# Patient Record
Sex: FEMALE | Race: WHITE | NOT HISPANIC OR LATINO | Employment: UNEMPLOYED | RURAL
[De-identification: names, ages, dates, MRNs, and addresses within clinical notes are randomized per-mention and may not be internally consistent; named-entity substitution may affect disease eponyms.]

---
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
## 2023-01-04 ENCOUNTER — CLINICAL SUPPORT (OUTPATIENT)
Dept: REHABILITATION | Facility: HOSPITAL | Age: 18
End: 2023-01-04
Payer: COMMERCIAL

## 2023-01-04 DIAGNOSIS — M48.061 SPINAL STENOSIS, LUMBAR REGION, WITHOUT NEUROGENIC CLAUDICATION: ICD-10-CM

## 2023-01-04 DIAGNOSIS — M51.26 OTHER INTERVERTEBRAL DISC DISPLACEMENT, LUMBAR REGION: ICD-10-CM

## 2023-01-04 DIAGNOSIS — M54.40 ACUTE BILATERAL LOW BACK PAIN WITH SCIATICA, SCIATICA LATERALITY UNSPECIFIED: Primary | ICD-10-CM

## 2023-01-04 DIAGNOSIS — M54.50 LOW BACK PAIN: ICD-10-CM

## 2023-01-04 PROCEDURE — 97161 PT EVAL LOW COMPLEX 20 MIN: CPT | Mod: PN

## 2023-01-04 PROCEDURE — 97110 THERAPEUTIC EXERCISES: CPT | Mod: PN

## 2023-01-04 NOTE — PLAN OF CARE
[facility]   Physical Therapy Initial Evaluation    Name: Mallory Rojo  Clinic Number: [id number]    Therapy Diagnosis:   Encounter Diagnoses   Name Primary?    Low back pain     Other intervertebral disc displacement, lumbar region     Spinal stenosis, lumbar region, without neurogenic claudication      Physician: Hamilton Holman MD    Physician Orders: PT Eval and Treat    Medical Diagnosis from Referral: spinal stenosis , with radiculopathy, lumbar and thoracic   Evaluation Date: 1/4/2023  Authorization Period Expiration: 2/3/2023  Plan of Care Expiration: 2/23/2023 blue cross unlimited , medicaid   Visit # / Visits authorized: 1/ unlimited     Time In: 1315  Time Out: 1400  Total Appointment Time (timed & untimed codes): 50 minutes    Precautions: Standard    Subjective   Date of onset: pt states she has had bad back pain greater than 5 years . Pt states she had low back sx 2020 feb. Pt voices her back has worsened and she has worse spinal stenosis and multiple ruptured disc and going to require sx to correct. Pt voices sitting still for a long time or being hunched over a long time hurts her the most. Pt voices all activities hurt her back , standing walking etc.    History of current condition - Mallory reports: hx below      Medical History:   Past Medical History:   Diagnosis Date    Asthma     Obesity        Surgical History:   Mallory Rojo  has a past surgical history that includes Back surgery and Tonsillectomy.    Medications:   Mallory has a current medication list which includes the following prescription(s): albuterol, albuterol-ipratropium, allergy relief (cetirizine), citalopram, diclofenac, epinephrine, ferrous sulfate, ibuprofen, montelukast, norelgestromin-ethinyl estradiol, tri-lo-sprintec, omeprazole, ondansetron, pregabalin, promethazine, symbicort, and tizanidine.    Allergies:   Review of patient's allergies indicates:   Allergen Reactions    Aller xt-tree pollen-white oak Other (See  Comments)    Cat hair standardized allergenic extract Other (See Comments)    Cobalt Dermatitis    Grass pollen-red top, standard Other (See Comments)    Imidazolidinyl urea Dermatitis    Neomycin Dermatitis    Urea Dermatitis    Weed pollen-dog fennel Other (See Comments)    Cefdinir Rash        Imaging, MRI studies:      Prior Therapy: 2 years ago   Social History:   lives with their family  Occupation: student   Prior Level of Function: pain with all activities  Current Level of Function: pain and radicular symptoms     Pain:  Current 6/10, worst 10/10, best 6/10   Location: bilateral back   Description: Aching, Dull, Burning, Throbbing, Grabbing, Tight, Deep, and Shooting  Aggravating Factors: Sitting, Standing, Laying, Bending, and Eating  Easing Factors: heating pad    Pts goals: to quit hurting .     Objective     Posture:  Standing lordosis: Increased  Sitting lordosis: Increased  Iliac crest height: right increased  PSIS height: right increased  Pelvic rotation/torsion: no  Scoliosis: no      MANUAL MUSCLE TEST  Right left   Hip flexion       L1-2 MMT number: 3+/5 MMT strength: 4/5   Hip abduction  L4,5,  S1 MMT strength: 3+/5 MMT strength: 4/5   Knee extension  L3 MMT strength: 3+/5 MMT strength: 4/5   Knee flexion       L3,4 MMT strength: 3+/5 MMT strength: 4-/5   Ankle dorsiflexion   L4 MMT strength: 3+/5 MMT strength: 4/5   Ext hallucis longus L5 MMT strength: 3+/5 MMT strength: 3+/5   Ankle plantar flexion S1 MMT strength: 3+/5 MMT strength: 3+/5      ROM/flexibility right left   Hip flexion (120) 95 95   Internal rotation (45) 35 25   External rotation (45) 35 35   Hamstring 90/90 (-10) -15 -15                    Special test Right  Left    SLR test < 60 degrees Negative Negative   SLR test > 60 degrees Negative Negative   Sitting slump test Negative Negative   Piriformis test Negative Negative   DELMAR test Positive Positive   SI forward bend Positive Positive   SI distraction Negative Positive   SI  compression Negative Positive     Gait assessment:   Antalgic gait: yes  Assistive device: none    Spinal mobility:  Segment:     Other test/information:    Palpation:     Dermatome:      Limitation/Restriction for FOTO lumbar  Survey    Therapist reviewed FOTO scores for Mallory Rojo on 1/4/2023.   FOTO documents entered into [employer] - see Media section.    Limitation Score: 34%         TREATMENT       Mallory received the treatments listed below:  THERAPEUTIC EXERCISES to develop strength, endurance, ROM, flexibility, posture, and core stabilization for 20 minutes including home ex program     Home Exercises and Patient Education Provided    Education provided:   - home ex program     Written Home Exercises Provided: yes.  Exercises were reviewed and Mallory was able to demonstrate them prior to the end of the session.  Mallory demonstrated good  understanding of the education provided.     See EMR under Patient Instructions for exercises provided 1/4/2023.    Assessment   Mallory is a 17 y.o. female referred to outpatient Physical Therapy with a medical diagnosis of low back pain with multiple disc rupture, thoracic and lumbar radiculopathy . Pt presents with decreased lumbar flexibility, weak core strength back pain with radiculopathy.     Pt prognosis is Excellent.   Pt will benefit from skilled outpatient Physical Therapy to address the deficits stated above and in the chart below, provide pt/family education, and to maximize pt's level of independence.     Plan of care discussed with patient: Yes  Pt's spiritual, cultural and educational needs considered and patient is agreeable to the plan of care and goals as stated below:     Anticipated Barriers for therapy: decreased range of motion and strength       Goals:  Short Term Goals: 4 weeks   Pt will be independent with home ex program.   Pt will increase bilateral hip flexion to 110 degrees   Pt will decrease back pain from 6/10  to 3/10 to improve quality of life    Increase lower extremity from 3+/5 to 5/5 to increase functional mobility and endurance.   Be able to return to work without radicular symptoms     Long Term Goals: 6 weeks   Pt will be able to work 8 hours  Be able to sleep all night without being awoken with pain.     Plan   Plan of care Certification: 1/4/2023 to 2/3/2023.    Outpatient Physical Therapy 2 times weekly for 4 weeks to include the following interventions: Patient Education, Therapeutic Exercise, Ultrasound, and modalities  .     Plan of care has been reestablished with Sherri CARR and Cheli CARR.       Khoa Kimble, PT           I CERTIFY THE NEED FOR THESE SERVICES FURNISHED UNDER THIS PLAN OF TREATMENT AND WHILE UNDER MY CARE.    Physician's comments:      Physician's Signature: ___________________________________________________

## 2023-01-18 ENCOUNTER — CLINICAL SUPPORT (OUTPATIENT)
Dept: REHABILITATION | Facility: HOSPITAL | Age: 18
End: 2023-01-18
Payer: COMMERCIAL

## 2023-01-18 DIAGNOSIS — M54.40 ACUTE BILATERAL LOW BACK PAIN WITH SCIATICA, SCIATICA LATERALITY UNSPECIFIED: Primary | ICD-10-CM

## 2023-01-18 DIAGNOSIS — M25.69 DECREASED RANGE OF MOTION OF TRUNK AND BACK: ICD-10-CM

## 2023-01-18 PROCEDURE — 97110 THERAPEUTIC EXERCISES: CPT | Mod: PN,CQ

## 2023-01-18 PROCEDURE — 97014 ELECTRIC STIMULATION THERAPY: CPT | Mod: PN,CQ

## 2023-01-18 NOTE — PROGRESS NOTES
Physical Therapy Treatment Note     Name: Mallory Rojo  Clinic Number: [id number]    Therapy Diagnosis: No diagnosis found.  Physician: Hamilton Holman MD    Visit Date: 1/18/2023    Physician Orders: PT Eval and Treat    Medical Diagnosis from Referral: spinal stenosis , with radiculopathy, lumbar and thoracic   Evaluation Date: 1/4/2023  Authorization Period Expiration: 2/3/2023  Plan of Care Expiration: 2/23/2023 blue cross unlimited , medicaid   Visit # / Visits authorized: 2/ unlimited   PTA Visit #: 1    Time In: 140  Time Out: 225  Total Billable Time: 45 minutes    Precautions: Standard  Plan of care reviewed with Khoa Kimble PT.      Subjective     Pt reports: I had an incident this am of loss of bladder control.  She was compliant with home exercise program.  Response to previous treatment: sore  Functional change: none noted    Pain: 6/10  Location: bilateral back      Objective     Mallory received therapeutic exercises to develop strength, endurance, ROM, flexibility, posture, and core stabilization for 25 minutes including:  Swissball knee flexion x 10  Swissball trunk rotation x 10  Bilateral single knee to chest x 5  Hip adduction with bolster x 15  Bilateral hamstrings stretch on step x 5  Bilateral side bend stretch x 5  Double support squats total gym x 20    Range of motion   Hip flexion   right  96        left  96   Hamstrings   right  -14       left  -14      Mallory received the following direct contact modalities after being cleared for contraindications:     Mallory received the following supervised modalities after being cleared for contradictions: IFC Electrical Stimulation:  Mallory received IFC Electrical Stimulation for pain control applied to the low back. Pt received stimulation at 100 % scan at a frequency of 14 for 20 minutes. Mallory tolderated treatment well without any adverse effects.      Mallory received hot pack for 20 minutes to low back.      Home Exercises Provided and Patient Education  Provided     Education provided: home exercise program     Written Home Exercises Provided: Patient instructed to cont prior HEP.  Exercises were reviewed and Mallory was able to demonstrate them prior to the end of the session.  Mallory demonstrated good understanding of the education provided.     See EMR under Patient Instructions for exercises provided prior visit.    Assessment     Patient instructed to contact Dr with incontinence issue. Patient voicing decreased pain 3/10 after treatment   Mallory Is progressing well towards her goals.   Pt prognosis is Fair.     Pt will continue to benefit from skilled outpatient physical therapy to address the deficits listed in the problem list box on initial evaluation, provide pt/family education and to maximize pt's level of independence in the home and community environment.     Pt's spiritual, cultural and educational needs considered and pt agreeable to plan of care and goals.     Anticipated barriers to physical therapy: compliance with home exercise program     Goals:  Short Term Goals: 4 weeks   Pt will be independent with home ex program.   Pt will increase bilateral hip flexion to 110 degrees   Pt will decrease back pain from 6/10  to 3/10 to improve quality of life   Increase lower extremity from 3+/5 to 5/5 to increase functional mobility and endurance.   Be able to return to work without radicular symptoms      Long Term Goals: 6 weeks   Pt will be able to work 8 hours  Be able to sleep all night without being awoken with pain.    Plan     Plan of care Certification: 1/4/2023 to 2/3/2023.     Outpatient Physical Therapy 2 times weekly for 4 weeks to include the following interventions: Patient Education, Therapeutic Exercise, Ultrasound, and modalities  .   Razia Phillips, PTA  1/18/2023

## 2023-02-16 NOTE — PLAN OF CARE
Physical Therapy Treatment Note      Name: Mallory Rojo  Clinic Number: [id number]     Therapy Diagnosis: No diagnosis found.  Physician: Hamilton Holman MD     Visit Date: 1/18/2023     Physician Orders: PT Eval and Treat    Medical Diagnosis from Referral: spinal stenosis , with radiculopathy, lumbar and thoracic   Evaluation Date: 1/4/2023  Authorization Period Expiration: 2/3/2023  Plan of Care Expiration: 2/23/2023 blue cross unlimited , medicaid   Visit # / Visits authorized: 2/ unlimited   PTA Visit #: 1     Time In: 140  Time Out: 225  Total Billable Time: 45 minutes     Precautions: Standard  Plan of care reviewed with Khoa Kimble PT.       Subjective      Pt reports: I had an incident this am of loss of bladder control.  She was compliant with home exercise program.  Response to previous treatment: sore  Functional change: none noted     Pain: 6/10  Location: bilateral back       Objective      Mallory received therapeutic exercises to develop strength, endurance, ROM, flexibility, posture, and core stabilization for 25 minutes including:  Swissball knee flexion x 10  Swissball trunk rotation x 10  Bilateral single knee to chest x 5  Hip adduction with bolster x 15  Bilateral hamstrings stretch on step x 5  Bilateral side bend stretch x 5  Double support squats total gym x 20     Range of motion   Hip flexion   right  96        left  96   Hamstrings   right  -14       left  -14        Mallory received the following direct contact modalities after being cleared for contraindications:      Mallory received the following supervised modalities after being cleared for contradictions: IFC Electrical Stimulation:  Mallory received IFC Electrical Stimulation for pain control applied to the low back. Pt received stimulation at 100 % scan at a frequency of 14 for 20 minutes. Mallory tolderated treatment well without any adverse effects.       Mallory received hot pack for 20 minutes to low back.        Home Exercises Provided and  Patient Education Provided      Education provided: home exercise program      Written Home Exercises Provided: Patient instructed to cont prior HEP.  Exercises were reviewed and Mallory was able to demonstrate them prior to the end of the session.  Mallory demonstrated good understanding of the education provided.      See EMR under Patient Instructions for exercises provided prior visit.     Assessment      Patient instructed to contact Dr with incontinence issue. Patient voicing decreased pain 3/10 after treatment   Mallory Is progressing well towards her goals.   Pt prognosis is Fair.      Pt will continue to benefit from skilled outpatient physical therapy to address the deficits listed in the problem list box on initial evaluation, provide pt/family education and to maximize pt's level of independence in the home and community environment.      Pt's spiritual, cultural and educational needs considered and pt agreeable to plan of care and goals.     Anticipated barriers to physical therapy: compliance with home exercise program      Goals:  Short Term Goals: 4 weeks   Pt will be independent with home ex program.   Pt will increase bilateral hip flexion to 110 degrees   Pt will decrease back pain from 6/10  to 3/10 to improve quality of life   Increase lower extremity from 3+/5 to 5/5 to increase functional mobility and endurance.   Be able to return to work without radicular symptoms      Long Term Goals: 6 weeks   Pt will be able to work 8 hours  Be able to sleep all night without being awoken with pain.     Plan     Outpatient Therapy Discharge Summary     Name: Mallory Rojo  Clinic Number: [id number]    Therapy Diagnosis:   Encounter Diagnoses   Name Primary?    Acute bilateral low back pain with sciatica, sciatica laterality unspecified Yes    Decreased range of motion of trunk and back      Physician: Hamilton Holman MD         Date of Last visit: 1/18/2023   pt attended 2 visits        Assessment    Goals:  Unable  to assess     Discharge reason: Patient has not attended therapy since 1/18/2023    Plan   This patient is discharged from Physical Therapy.    Khoa Kimble, PT

## 2023-04-05 DIAGNOSIS — J45.909 ASTHMA, UNSPECIFIED ASTHMA SEVERITY, UNSPECIFIED WHETHER COMPLICATED, UNSPECIFIED WHETHER PERSISTENT: ICD-10-CM

## 2023-04-06 RX ORDER — ALBUTEROL SULFATE 90 UG/1
AEROSOL, METERED RESPIRATORY (INHALATION)
Qty: 36 G | Refills: 5 | OUTPATIENT
Start: 2023-04-06

## 2023-04-06 RX ORDER — ALBUTEROL SULFATE 90 UG/1
2 AEROSOL, METERED RESPIRATORY (INHALATION) EVERY 4 HOURS PRN
Qty: 36 G | Refills: 5 | Status: SHIPPED | OUTPATIENT
Start: 2023-04-06 | End: 2023-10-06 | Stop reason: SDUPTHER

## 2023-06-01 DIAGNOSIS — J45.909 ASTHMA, UNSPECIFIED ASTHMA SEVERITY, UNSPECIFIED WHETHER COMPLICATED, UNSPECIFIED WHETHER PERSISTENT: ICD-10-CM

## 2023-06-01 DIAGNOSIS — J30.89 NON-SEASONAL ALLERGIC RHINITIS, UNSPECIFIED TRIGGER: ICD-10-CM

## 2023-06-02 RX ORDER — CETIRIZINE HYDROCHLORIDE 10 MG/1
TABLET ORAL
Qty: 30 TABLET | Refills: 2 | Status: SHIPPED | OUTPATIENT
Start: 2023-06-02 | End: 2023-10-06 | Stop reason: SDUPTHER

## 2023-06-02 RX ORDER — MONTELUKAST SODIUM 10 MG/1
10 TABLET ORAL NIGHTLY
Qty: 30 TABLET | Refills: 2 | Status: SHIPPED | OUTPATIENT
Start: 2023-06-02 | End: 2023-10-06 | Stop reason: SDUPTHER

## 2023-06-02 RX ORDER — BUDESONIDE AND FORMOTEROL FUMARATE DIHYDRATE 80; 4.5 UG/1; UG/1
AEROSOL RESPIRATORY (INHALATION)
Qty: 10.2 G | Refills: 11 | Status: SHIPPED | OUTPATIENT
Start: 2023-06-02 | End: 2023-11-14 | Stop reason: SDUPTHER

## 2023-06-02 NOTE — TELEPHONE ENCOUNTER
Please see the attached refill request. Patient notified that she will need office visit for additional refills.

## 2023-08-14 DIAGNOSIS — M54.50 LOWER BACK PAIN: Primary | ICD-10-CM

## 2023-08-15 DIAGNOSIS — M54.16 LUMBAR RADICULOPATHY: Primary | ICD-10-CM

## 2023-08-28 ENCOUNTER — HOSPITAL ENCOUNTER (OUTPATIENT)
Dept: RADIOLOGY | Facility: HOSPITAL | Age: 18
Discharge: HOME | End: 2023-08-28
Attending: ORTHOPAEDIC SURGERY
Payer: COMMERCIAL

## 2023-08-28 DIAGNOSIS — M54.16 LUMBAR RADICULOPATHY: ICD-10-CM

## 2023-08-31 ENCOUNTER — HOSPITAL ENCOUNTER (OUTPATIENT)
Dept: RADIOLOGY | Facility: HOSPITAL | Age: 18
Discharge: HOME | End: 2023-08-31
Attending: ORTHOPAEDIC SURGERY
Payer: COMMERCIAL

## 2023-08-31 PROCEDURE — 72110 X-RAY EXAM L-2 SPINE 4/>VWS: CPT | Mod: TC

## 2023-09-01 ENCOUNTER — OFFICE VISIT (OUTPATIENT)
Dept: SPINE | Facility: CLINIC | Age: 18
End: 2023-09-01
Payer: COMMERCIAL

## 2023-09-01 VITALS
HEIGHT: 69 IN | TEMPERATURE: 98 F | HEART RATE: 73 BPM | WEIGHT: 293 LBS | RESPIRATION RATE: 18 BRPM | DIASTOLIC BLOOD PRESSURE: 78 MMHG | OXYGEN SATURATION: 98 % | SYSTOLIC BLOOD PRESSURE: 120 MMHG | BODY MASS INDEX: 43.4 KG/M2

## 2023-09-01 DIAGNOSIS — Z01.818 PRE-OPERATIVE EXAMINATION: Primary | ICD-10-CM

## 2023-09-01 DIAGNOSIS — M54.50 LOWER BACK PAIN: ICD-10-CM

## 2023-09-01 DIAGNOSIS — M54.16 LUMBAR RADICULOPATHY: ICD-10-CM

## 2023-09-01 PROCEDURE — 3078F DIAST BP <80 MM HG: CPT | Mod: CPTII,,, | Performed by: NURSE PRACTITIONER

## 2023-09-01 PROCEDURE — 99215 OFFICE O/P EST HI 40 MIN: CPT | Mod: PBBFAC | Performed by: ORTHOPAEDIC SURGERY

## 2023-09-01 PROCEDURE — 3008F BODY MASS INDEX DOCD: CPT | Mod: CPTII,,, | Performed by: NURSE PRACTITIONER

## 2023-09-01 PROCEDURE — 1159F PR MEDICATION LIST DOCUMENTED IN MEDICAL RECORD: ICD-10-PCS | Mod: CPTII,,, | Performed by: NURSE PRACTITIONER

## 2023-09-01 PROCEDURE — 1159F MED LIST DOCD IN RCRD: CPT | Mod: CPTII,,, | Performed by: NURSE PRACTITIONER

## 2023-09-01 PROCEDURE — 3074F SYST BP LT 130 MM HG: CPT | Mod: CPTII,,, | Performed by: NURSE PRACTITIONER

## 2023-09-01 PROCEDURE — 3078F PR MOST RECENT DIASTOLIC BLOOD PRESSURE < 80 MM HG: ICD-10-PCS | Mod: CPTII,,, | Performed by: NURSE PRACTITIONER

## 2023-09-01 PROCEDURE — 99204 PR OFFICE/OUTPT VISIT, NEW, LEVL IV, 45-59 MIN: ICD-10-PCS | Mod: S$PBB,,, | Performed by: NURSE PRACTITIONER

## 2023-09-01 PROCEDURE — 99204 OFFICE O/P NEW MOD 45 MIN: CPT | Mod: S$PBB,,, | Performed by: NURSE PRACTITIONER

## 2023-09-01 PROCEDURE — 3008F PR BODY MASS INDEX (BMI) DOCUMENTED: ICD-10-PCS | Mod: CPTII,,, | Performed by: NURSE PRACTITIONER

## 2023-09-01 PROCEDURE — 3074F PR MOST RECENT SYSTOLIC BLOOD PRESSURE < 130 MM HG: ICD-10-PCS | Mod: CPTII,,, | Performed by: NURSE PRACTITIONER

## 2023-09-01 NOTE — PROGRESS NOTES
MDM/time:  Greater than 45 minutes spent on this encounter including 15 minutes reviewing imaging and notes, 20 minutes with the patient, 10 minutes documentation    ASSESSMENT:  17 y.o. female with lumbar spondylosis with radiculopathy    PLAN:  Discussed surgical options - L2-L5 laminectomy     HPI:  17 y.o. female here for evaluation of lower back pain that radiates into bilateral hips left groin area and down left leg.  She also complains of pain between the shoulder blades that will radiate around her ribcage area and into her neck.  She reports a 5+ year history of back pain.  She reports initially when pain started she was seen in the emergency room and by multiple doctors and was told she was having groin pain.  She reports pain with sharp stabbing with burning sensation.  She reports pain progressed to the point she was unable to stand and walk for any length of time and was in a wheelchair.  In 2019 she was seen in [place] by Dr. Holman MRIs were taken and surgery was scheduled.  She reports surgery went well but still had pain after surgery but was not in a wheelchair any longer.  She is had multiple sessions of physical therapy after this surgery that did not seem to improve her pain.  She is been seeing [facility] Dr. Davis and has had a total of 4 epidural injections which has not helped her pain discomfort.  She reports issues with balance with multiple falls.  Bladder urgency but no incontinence or bowel issues.  Can not stand or walk for more than 5 minutes at a time.  Currently taking ibuprofen and Tylenol as needed for pain.  She is also on Lyrica 75 twice a day for pain.  She is had multiple sessions of physical therapy with little to no pain relief.  Pain management as discussed above.  Recent MRI lumbar spine.  Prior spine surgery with Dr. Holman this was and L3-4 left hemilaminectomy and L4-5 left jacky laminectomy.  Patient currently vapes nicotine    IMAGING:  AP, lateral,  flexion/extension views of the lumbar spine reviewed     On the AP there is normal coronal alignment.  There are 5 non-rib-bearing lumbar vertebrae.  On the lateral there is maintained lumbar lordosis.  No fractures or listhesis noted.  No instability on flexion-extension views.     Impression:  Normal lumbar spine      07/17/2023 MRI lumbar spine [facility] reviewed showed:   L2-3 central disc herniation with severe central/bilateral lateral recess stenosis   L3-4 prior left hemilaminotomy moderate bilateral recess stenosis  L4-5 left paracentral disc herniation with prior left hemilaminotomy moderate bilateral lateral recess stenosis    T10-11 central disc protrusion no significant stenosis    Past Medical History:   Diagnosis Date    Anxiety     Asthma     Obesity      Past Surgical History:   Procedure Laterality Date    BACK SURGERY      TONSILLECTOMY       Social History     Tobacco Use    Smoking status: Former     Types: Vaping with nicotine    Smokeless tobacco: Never   Substance Use Topics    Alcohol use: Yes     Comment: occ    Drug use: Never      Current Outpatient Medications   Medication Instructions    albuterol (VENTOLIN HFA) 90 mcg/actuation inhaler 2 puffs, Inhalation, Every 4 hours PRN, Rescue    albuterol-ipratropium (DUO-NEB) 2.5 mg-0.5 mg/3 mL nebulizer solution USE 1 AMPULE IN NEBULIZER EVERY 6 HOURS AS NEEDED FOR WHEEZING    cetirizine (ZYRTEC) 10 MG tablet Take 1 tablet (10 mg total) by mouth once daily.    citalopram (CELEXA) 20 mg, Oral, Daily    diclofenac (VOLTAREN) 75 mg, Oral, Daily    EPINEPHrine (EPIPEN) 0.3 mg, Intramuscular, As needed (PRN)    ferrous sulfate 325 mg, Oral, Daily    ibuprofen (ADVIL,MOTRIN) 800 mg, Oral, Every 6 hours PRN    montelukast (SINGULAIR) 10 mg, Oral, Nightly    norelgestromin-ethinyl estradiol (ORTHO EVRA) 150-35 mcg/24 hr 1 patch, Transdermal, Weekly    norgestimate-ethinyl estradioL (TRI-LO-SPRINTEC) 0.18/0.215/0.25 mg-25 mcg tablet 1 tablet,  Oral, Daily    omeprazole (PRILOSEC) 40 mg, Oral, Every morning    ondansetron (ZOFRAN-ODT) 4 mg, Oral, 2 times daily    pregabalin (LYRICA) 75 mg, Oral, 2 times daily    promethazine (PHENERGAN) 25 mg, Rectal, Every 6 hours PRN    SYMBICORT 80-4.5 mcg/actuation HFAA inhale TWO puffs BY MOUTH TWICE DAILY    tiZANidine (ZANAFLEX) 4 MG tablet TAKE 1 TABLET BY MOUTH EVERY DAY AT BEDTIME AS NEEDED        EXAM:  Constitutional  General Appearance:  Body mass index is 44.01 kg/m²., NAD  Psychiatric   Orientation: Oriented to time, oriented to place, oriented to person  Mood and Affect: Active and alert, normal mood, normal affect  Gait and Station   Appearance:  Antalgic gait to the left, unable to tandem gait, able to walk on toes, able to walk on heels    LUMBAR  Musculoskeletal System   Hips: Normal appearance, no leg length discrepancy, normal motion; left, normal motion; right    Lumbar Spine                   Inspection:  Normal alignment, normal sagittal balance                  Range of motion: Decreased flexion, extension, lateral bending, rotation. Pain with range of motion                  Bony Palpation of the Lumbar Spine:  No tenderness of the spinous process, no tenderness of the sacrum, no tenderness of the coccyx                  Bony Palpation of the Right Hip:  No tenderness of the iliac crest, no tenderness of the sciatic notch, no tenderness of the SI joint                  Bony Palpation of the Left Hip:  No tenderness of the iliac crest, no tenderness of the sciatic notch, no tenderness of the SI joint                  Soft Tissue Palpation on the Right:  No tenderness of the paraspinal region, no tenderness of the iliolumbar region                  Soft Tissue Palpation on the Left:  No tenderness of the paraspinal region, no tenderness of the iliolumbar region    Motor Strength   L1 Right:  Hip flexion iliopsoas 4/5    L1 Left:  Hip flexion iliopsoas 4/5              L2-L4 Right:  Knee extension  quadriceps 4/5, tibialis anterior 5/5              L2-L4 Left:  Knee extension quadriceps 5/5, tibialis anterior 5/5   L5 Right:  Extensor hallucis llongus 4/5,    L5 Left:  Extensor hallucis longus 5/5,    S1 Right:  Plantar flexion gastrocnemius 4/5   S1 Left:  Plantar flexion gastrocnemius 5/5    Neurological System   Ankle Reflex Right:  normal   Ankle Reflex Left: normal   Knee Reflex Right:  normal   Knee Reflex Left:  normal   Sensation on the Right:  L2 normal, L3 normal, L4 normal, L5 normal, S1 normal   Sensation on the Left:  L2 normal, L3 normal, L4 normal, L5 normal, S1 normal              Special Test on the Right:  Seated straight leg raising test negative, no clonus of the ankle              Special Test on the Left:  Seated straight leg raising test negative, no clonus of the ankle    Skin   Lumbosacral Spine:  Normal skin    Cardiovascular System   Arterial Pulses Right:  Posterior tibialis normal, dorsalis pedis normal   Arterial Pulses Left:  Posterior tibialis normal, dorsalis pedis normal   Edema Right: None   Edema Left:  None

## 2023-09-01 NOTE — PROGRESS NOTES
AP, lateral, flexion/extension views of the lumbar spine reviewed    On the AP there is normal coronal alignment.  There are 5 non-rib-bearing lumbar vertebrae.  On the lateral there is maintained lumbar lordosis.  No fractures or listhesis noted.  No instability on flexion-extension views.    Impression:  Normal lumbar spine

## 2023-09-01 NOTE — PATIENT INSTRUCTIONS
Surgery is scheduled for 09/27/2023.    You will need to do the following Pre-op workup:  1.Labs  2. Chest xray- at the [facility]  3. EKG- 2nd floor

## 2023-09-05 RX ORDER — MUPIROCIN 20 MG/G
OINTMENT TOPICAL
Status: CANCELLED | OUTPATIENT
Start: 2023-09-05

## 2023-09-05 RX ORDER — SODIUM CHLORIDE 9 MG/ML
INJECTION, SOLUTION INTRAVENOUS CONTINUOUS
Status: CANCELLED | OUTPATIENT
Start: 2023-09-05

## 2023-09-21 ENCOUNTER — CLINICAL SUPPORT (OUTPATIENT)
Dept: RESPIRATORY THERAPY | Facility: HOSPITAL | Age: 18
End: 2023-09-21
Attending: ORTHOPAEDIC SURGERY
Payer: COMMERCIAL

## 2023-09-21 ENCOUNTER — HOSPITAL ENCOUNTER (OUTPATIENT)
Dept: RADIOLOGY | Facility: HOSPITAL | Age: 18
Discharge: HOME | End: 2023-09-21
Attending: ORTHOPAEDIC SURGERY
Payer: COMMERCIAL

## 2023-09-21 DIAGNOSIS — Z01.818 PRE-OPERATIVE EXAMINATION: ICD-10-CM

## 2023-09-21 PROCEDURE — 71046 X-RAY EXAM CHEST 2 VIEWS: CPT | Mod: TC

## 2023-09-21 PROCEDURE — 93010 ELECTROCARDIOGRAM REPORT: CPT | Mod: ,,, | Performed by: INTERNAL MEDICINE

## 2023-09-21 PROCEDURE — 93005 ELECTROCARDIOGRAM TRACING: CPT

## 2023-09-21 PROCEDURE — 93010 EKG 12-LEAD: ICD-10-PCS | Mod: ,,, | Performed by: INTERNAL MEDICINE

## 2023-09-25 ENCOUNTER — OFFICE VISIT (OUTPATIENT)
Dept: SPINE | Facility: CLINIC | Age: 18
End: 2023-09-25
Payer: COMMERCIAL

## 2023-09-25 DIAGNOSIS — Z01.818 PRE-OPERATIVE EXAMINATION: ICD-10-CM

## 2023-09-25 DIAGNOSIS — M48.062 LUMBAR STENOSIS WITH NEUROGENIC CLAUDICATION: Primary | ICD-10-CM

## 2023-09-25 DIAGNOSIS — M54.16 LUMBAR RADICULOPATHY: ICD-10-CM

## 2023-09-25 PROCEDURE — 3044F HG A1C LEVEL LT 7.0%: CPT | Mod: CPTII,,, | Performed by: ORTHOPAEDIC SURGERY

## 2023-09-25 PROCEDURE — 99215 OFFICE O/P EST HI 40 MIN: CPT | Mod: S$PBB,,, | Performed by: ORTHOPAEDIC SURGERY

## 2023-09-25 PROCEDURE — 99215 PR OFFICE/OUTPT VISIT, EST, LEVL V, 40-54 MIN: ICD-10-PCS | Mod: S$PBB,,, | Performed by: ORTHOPAEDIC SURGERY

## 2023-09-25 PROCEDURE — 3044F PR MOST RECENT HEMOGLOBIN A1C LEVEL <7.0%: ICD-10-PCS | Mod: CPTII,,, | Performed by: ORTHOPAEDIC SURGERY

## 2023-09-25 PROCEDURE — 99212 OFFICE O/P EST SF 10 MIN: CPT | Mod: PBBFAC | Performed by: ORTHOPAEDIC SURGERY

## 2023-09-25 RX ORDER — PROMETHAZINE HYDROCHLORIDE 25 MG/1
25 TABLET ORAL EVERY 4 HOURS
Qty: 45 TABLET | Refills: 0 | Status: SHIPPED | OUTPATIENT
Start: 2023-09-25

## 2023-09-25 RX ORDER — OXYCODONE AND ACETAMINOPHEN 5; 325 MG/1; MG/1
1 TABLET ORAL EVERY 4 HOURS PRN
Qty: 45 TABLET | Refills: 0 | Status: SHIPPED | OUTPATIENT
Start: 2023-09-25 | End: 2023-10-10 | Stop reason: SDUPTHER

## 2023-09-25 RX ORDER — CYCLOBENZAPRINE HCL 5 MG
5 TABLET ORAL 3 TIMES DAILY PRN
Qty: 45 TABLET | Refills: 0 | Status: SHIPPED | OUTPATIENT
Start: 2023-09-25 | End: 2023-10-10

## 2023-09-25 NOTE — PATIENT INSTRUCTIONS
Arrive to the ground floor of the AMBULATORY surgery building at 6:30AM  Do not eat or drink after midnight (this includes, gum, candy, chewing tobacco etc.)  Bring all medication in the original bottles.  Bring anything you may need for an overnight stay.  Bathe with Mayra-Hex the night or morning before your surgery.  The morning of you surgery ONLY take blood pressure medications, heart medications, medications for acid reflux and thyroid medications (if you are on any, that you take in the AM).  Take these medications with a sip of water.   Do NOT take any diabetic medications the AM of your surgery.  Be sure that you have stopped blood thinners at appropriate time, as instructed.  Bring your C-Pap machine if you have one.  All jewelry and piercings MUST be removed prior to surgery.   False eye lashes must be removed prior to surgery.    Prescriptions that we usually send in for after surgery are:    PERCOCET(OXYCODONE)- This is to be taken for pain.  FLEXERIL(CYCLOBENZAPRINE)- This is to be taken for muscle spasms/tightness  PHENERGAN- This medication is to be taken for nausea.      If you take any of the following medications STOP them ONE WEEK before surgery:  Ozempic,weqovy,rybelsus, Dulaglutide(Trulicity), Tiazepatide(Mounjaro), Bydurcon    If you take any of the following medications STOP them 24 hours PRIOR to your surgery:  Libraglutide (saxenda), Adlyxen (Lixixerutose), Louis Cazares Victoza.  [email or web address]

## 2023-09-25 NOTE — PROGRESS NOTES
MDM/time:  Greater than 40 minutes spent on this encounter including 10 minutes reviewing imaging and notes, 25 minutes with the patient, 5 minutes documentation    ASSESSMENT:  17 y.o. female with lumbar spondylosis with radiculopathy    PLAN:  She has exhausted nonoperative measures and elected proceed with surgery.  This will be a L2-L5 laminectomy     The final decision for surgery was made today during the office visit. The rationale, technique, recovery process, non-operative alternatives and potential complications associated with posterior lumbar decompression (e.g. laminectomy or discectomy) were discussed with the patient in detail. The specific risks discussed included: medical/anesthetic complications, positioning palsy, infection, dural tear, epidural hematoma, wound hematoma, major vascular injury with life threatening hemorrhage, thromboembolism, paraplegia due to spinal cord or cauda equina injury, bowel/bladder/sexual dysfunction, lower extremity weakness/sensory loss due to nerve root injury, recurrent stenosis or disc herniation, progressive spondylosis/disc degeneration, chronic back pain/stiffness, chronic lower extremity pain, incomplete relief of preoperative symptoms and possible need for further surgery. All questions posed by the patient were answered. The surgical consent form was signed.     HPI:  17 y.o. female here for evaluation of lower back pain that radiates into bilateral hips left groin area and down left leg.  She also complains of pain between the shoulder blades that will radiate around her ribcage area and into her neck.  She reports a 5+ year history of back pain.  She reports initially when pain started she was seen in the emergency room and by multiple doctors and was told she was having groin pain.  She reports pain with sharp stabbing with burning sensation.  She reports pain progressed to the point she was unable to stand and walk for any length of time and was in a  wheelchair.  In 2019 she was seen in [place] by Dr. Holman MRIs were taken and surgery was scheduled.  She reports surgery went well but still had pain after surgery but was not in a wheelchair any longer.  She is had multiple sessions of physical therapy after this surgery that did not seem to improve her pain.  She is been seeing [facility] Dr. Davis and has had a total of 4 epidural injections which has not helped her pain discomfort.  She reports issues with balance with multiple falls.  Bladder urgency but no incontinence or bowel issues.  Can not stand or walk for more than 5 minutes at a time.  Currently taking ibuprofen and Tylenol as needed for pain.  She is also on Lyrica 75 twice a day for pain.  She is had multiple sessions of physical therapy with little to no pain relief.  Pain management as discussed above.  Recent MRI lumbar spine.  Prior spine surgery with Dr. Holman this was and L3-4 left hemilaminectomy and L4-5 left jacky laminectomy.  Patient currently vapes nicotine    IMAGING:  AP, lateral, flexion/extension views of the lumbar spine reviewed     On the AP there is normal coronal alignment.  There are 5 non-rib-bearing lumbar vertebrae.  On the lateral there is maintained lumbar lordosis.  No fractures or listhesis noted.  No instability on flexion-extension views.     Impression:  Normal lumbar spine      07/17/2023 MRI lumbar spine [facility] reviewed showed:   L2-3 central disc herniation with severe central/bilateral lateral recess stenosis   L3-4 prior left hemilaminotomy moderate bilateral recess stenosis  L4-5 left paracentral disc herniation with prior left hemilaminotomy moderate bilateral lateral recess stenosis    T10-11 central disc protrusion no significant stenosis    Past Medical History:   Diagnosis Date    Anxiety     Asthma     Obesity      Past Surgical History:   Procedure Laterality Date    BACK SURGERY      TONSILLECTOMY       Social History     Tobacco Use     Smoking status: Former     Types: Vaping with nicotine    Smokeless tobacco: Never   Substance Use Topics    Alcohol use: Yes     Comment: occ    Drug use: Never      Current Outpatient Medications   Medication Instructions    albuterol (VENTOLIN HFA) 90 mcg/actuation inhaler 2 puffs, Inhalation, Every 4 hours PRN, Rescue    albuterol-ipratropium (DUO-NEB) 2.5 mg-0.5 mg/3 mL nebulizer solution USE 1 AMPULE IN NEBULIZER EVERY 6 HOURS AS NEEDED FOR WHEEZING    cetirizine (ZYRTEC) 10 MG tablet Take 1 tablet (10 mg total) by mouth once daily.    citalopram (CELEXA) 20 mg, Oral, Daily    diclofenac (VOLTAREN) 75 mg, Oral, Daily    EPINEPHrine (EPIPEN) 0.3 mg, Intramuscular, As needed (PRN)    ferrous sulfate 325 mg, Oral, Daily    ibuprofen (ADVIL,MOTRIN) 800 mg, Oral, Every 6 hours PRN    montelukast (SINGULAIR) 10 mg, Oral, Nightly    norelgestromin-ethinyl estradiol (ORTHO EVRA) 150-35 mcg/24 hr 1 patch, Transdermal, Weekly    norgestimate-ethinyl estradioL (TRI-LO-SPRINTEC) 0.18/0.215/0.25 mg-25 mcg tablet 1 tablet, Oral, Daily    omeprazole (PRILOSEC) 40 mg, Oral, Every morning    ondansetron (ZOFRAN-ODT) 4 mg, Oral, 2 times daily    pregabalin (LYRICA) 75 mg, Oral, 2 times daily    promethazine (PHENERGAN) 25 mg, Rectal, Every 6 hours PRN    SYMBICORT 80-4.5 mcg/actuation HFAA inhale TWO puffs BY MOUTH TWICE DAILY    tiZANidine (ZANAFLEX) 4 MG tablet TAKE 1 TABLET BY MOUTH EVERY DAY AT BEDTIME AS NEEDED        EXAM:  Constitutional  General Appearance:  There is no height or weight on file to calculate BMI., NAD  Psychiatric   Orientation: Oriented to time, oriented to place, oriented to person  Mood and Affect: Active and alert, normal mood, normal affect  Gait and Station   Appearance:  Antalgic gait to the left, unable to tandem gait, able to walk on toes, able to walk on heels    LUMBAR  Musculoskeletal System   Hips: Normal appearance, no leg length discrepancy, normal motion; left, normal motion;  right    Lumbar Spine                   Inspection:  Normal alignment, normal sagittal balance                  Range of motion: Decreased flexion, extension, lateral bending, rotation. Pain with range of motion                  Bony Palpation of the Lumbar Spine:  No tenderness of the spinous process, no tenderness of the sacrum, no tenderness of the coccyx                  Bony Palpation of the Right Hip:  No tenderness of the iliac crest, no tenderness of the sciatic notch, no tenderness of the SI joint                  Bony Palpation of the Left Hip:  No tenderness of the iliac crest, no tenderness of the sciatic notch, no tenderness of the SI joint                  Soft Tissue Palpation on the Right:  No tenderness of the paraspinal region, no tenderness of the iliolumbar region                  Soft Tissue Palpation on the Left:  No tenderness of the paraspinal region, no tenderness of the iliolumbar region    Motor Strength   L1 Right:  Hip flexion iliopsoas 4/5    L1 Left:  Hip flexion iliopsoas 4/5              L2-L4 Right:  Knee extension quadriceps 4/5, tibialis anterior 5/5              L2-L4 Left:  Knee extension quadriceps 5/5, tibialis anterior 5/5   L5 Right:  Extensor hallucis llongus 4/5,    L5 Left:  Extensor hallucis longus 5/5,    S1 Right:  Plantar flexion gastrocnemius 4/5   S1 Left:  Plantar flexion gastrocnemius 5/5    Neurological System   Ankle Reflex Right:  normal   Ankle Reflex Left: normal   Knee Reflex Right:  normal   Knee Reflex Left:  normal   Sensation on the Right:  L2 normal, L3 normal, L4 normal, L5 normal, S1 normal   Sensation on the Left:  L2 normal, L3 normal, L4 normal, L5 normal, S1 normal              Special Test on the Right:  Seated straight leg raising test negative, no clonus of the ankle              Special Test on the Left:  Seated straight leg raising test negative, no clonus of the ankle    Skin   Lumbosacral Spine:  Normal skin    Cardiovascular  System   Arterial Pulses Right:  Posterior tibialis normal, dorsalis pedis normal   Arterial Pulses Left:  Posterior tibialis normal, dorsalis pedis normal   Edema Right: None   Edema Left:  None

## 2023-09-25 NOTE — H&P (VIEW-ONLY)
MDM/time:  Greater than 40 minutes spent on this encounter including 10 minutes reviewing imaging and notes, 25 minutes with the patient, 5 minutes documentation    ASSESSMENT:  17 y.o. female with lumbar spondylosis with radiculopathy    PLAN:  She has exhausted nonoperative measures and elected proceed with surgery.  This will be a L2-L5 laminectomy     The final decision for surgery was made today during the office visit. The rationale, technique, recovery process, non-operative alternatives and potential complications associated with posterior lumbar decompression (e.g. laminectomy or discectomy) were discussed with the patient in detail. The specific risks discussed included: medical/anesthetic complications, positioning palsy, infection, dural tear, epidural hematoma, wound hematoma, major vascular injury with life threatening hemorrhage, thromboembolism, paraplegia due to spinal cord or cauda equina injury, bowel/bladder/sexual dysfunction, lower extremity weakness/sensory loss due to nerve root injury, recurrent stenosis or disc herniation, progressive spondylosis/disc degeneration, chronic back pain/stiffness, chronic lower extremity pain, incomplete relief of preoperative symptoms and possible need for further surgery. All questions posed by the patient were answered. The surgical consent form was signed.     HPI:  17 y.o. female here for evaluation of lower back pain that radiates into bilateral hips left groin area and down left leg.  She also complains of pain between the shoulder blades that will radiate around her ribcage area and into her neck.  She reports a 5+ year history of back pain.  She reports initially when pain started she was seen in the emergency room and by multiple doctors and was told she was having groin pain.  She reports pain with sharp stabbing with burning sensation.  She reports pain progressed to the point she was unable to stand and walk for any length of time and was in a  wheelchair.  In 2019 she was seen in [place] by Dr. Holman MRIs were taken and surgery was scheduled.  She reports surgery went well but still had pain after surgery but was not in a wheelchair any longer.  She is had multiple sessions of physical therapy after this surgery that did not seem to improve her pain.  She is been seeing [facility] Dr. Davis and has had a total of 4 epidural injections which has not helped her pain discomfort.  She reports issues with balance with multiple falls.  Bladder urgency but no incontinence or bowel issues.  Can not stand or walk for more than 5 minutes at a time.  Currently taking ibuprofen and Tylenol as needed for pain.  She is also on Lyrica 75 twice a day for pain.  She is had multiple sessions of physical therapy with little to no pain relief.  Pain management as discussed above.  Recent MRI lumbar spine.  Prior spine surgery with Dr. Holman this was and L3-4 left hemilaminectomy and L4-5 left jacky laminectomy.  Patient currently vapes nicotine    IMAGING:  AP, lateral, flexion/extension views of the lumbar spine reviewed     On the AP there is normal coronal alignment.  There are 5 non-rib-bearing lumbar vertebrae.  On the lateral there is maintained lumbar lordosis.  No fractures or listhesis noted.  No instability on flexion-extension views.     Impression:  Normal lumbar spine      07/17/2023 MRI lumbar spine [facility] reviewed showed:   L2-3 central disc herniation with severe central/bilateral lateral recess stenosis   L3-4 prior left hemilaminotomy moderate bilateral recess stenosis  L4-5 left paracentral disc herniation with prior left hemilaminotomy moderate bilateral lateral recess stenosis    T10-11 central disc protrusion no significant stenosis    Past Medical History:   Diagnosis Date    Anxiety     Asthma     Obesity      Past Surgical History:   Procedure Laterality Date    BACK SURGERY      TONSILLECTOMY       Social History     Tobacco Use     Smoking status: Former     Types: Vaping with nicotine    Smokeless tobacco: Never   Substance Use Topics    Alcohol use: Yes     Comment: occ    Drug use: Never      Current Outpatient Medications   Medication Instructions    albuterol (VENTOLIN HFA) 90 mcg/actuation inhaler 2 puffs, Inhalation, Every 4 hours PRN, Rescue    albuterol-ipratropium (DUO-NEB) 2.5 mg-0.5 mg/3 mL nebulizer solution USE 1 AMPULE IN NEBULIZER EVERY 6 HOURS AS NEEDED FOR WHEEZING    cetirizine (ZYRTEC) 10 MG tablet Take 1 tablet (10 mg total) by mouth once daily.    citalopram (CELEXA) 20 mg, Oral, Daily    diclofenac (VOLTAREN) 75 mg, Oral, Daily    EPINEPHrine (EPIPEN) 0.3 mg, Intramuscular, As needed (PRN)    ferrous sulfate 325 mg, Oral, Daily    ibuprofen (ADVIL,MOTRIN) 800 mg, Oral, Every 6 hours PRN    montelukast (SINGULAIR) 10 mg, Oral, Nightly    norelgestromin-ethinyl estradiol (ORTHO EVRA) 150-35 mcg/24 hr 1 patch, Transdermal, Weekly    norgestimate-ethinyl estradioL (TRI-LO-SPRINTEC) 0.18/0.215/0.25 mg-25 mcg tablet 1 tablet, Oral, Daily    omeprazole (PRILOSEC) 40 mg, Oral, Every morning    ondansetron (ZOFRAN-ODT) 4 mg, Oral, 2 times daily    pregabalin (LYRICA) 75 mg, Oral, 2 times daily    promethazine (PHENERGAN) 25 mg, Rectal, Every 6 hours PRN    SYMBICORT 80-4.5 mcg/actuation HFAA inhale TWO puffs BY MOUTH TWICE DAILY    tiZANidine (ZANAFLEX) 4 MG tablet TAKE 1 TABLET BY MOUTH EVERY DAY AT BEDTIME AS NEEDED        EXAM:  Constitutional  General Appearance:  There is no height or weight on file to calculate BMI., NAD  Psychiatric   Orientation: Oriented to time, oriented to place, oriented to person  Mood and Affect: Active and alert, normal mood, normal affect  Gait and Station   Appearance:  Antalgic gait to the left, unable to tandem gait, able to walk on toes, able to walk on heels    LUMBAR  Musculoskeletal System   Hips: Normal appearance, no leg length discrepancy, normal motion; left, normal motion;  right    Lumbar Spine                   Inspection:  Normal alignment, normal sagittal balance                  Range of motion: Decreased flexion, extension, lateral bending, rotation. Pain with range of motion                  Bony Palpation of the Lumbar Spine:  No tenderness of the spinous process, no tenderness of the sacrum, no tenderness of the coccyx                  Bony Palpation of the Right Hip:  No tenderness of the iliac crest, no tenderness of the sciatic notch, no tenderness of the SI joint                  Bony Palpation of the Left Hip:  No tenderness of the iliac crest, no tenderness of the sciatic notch, no tenderness of the SI joint                  Soft Tissue Palpation on the Right:  No tenderness of the paraspinal region, no tenderness of the iliolumbar region                  Soft Tissue Palpation on the Left:  No tenderness of the paraspinal region, no tenderness of the iliolumbar region    Motor Strength   L1 Right:  Hip flexion iliopsoas 4/5    L1 Left:  Hip flexion iliopsoas 4/5              L2-L4 Right:  Knee extension quadriceps 4/5, tibialis anterior 5/5              L2-L4 Left:  Knee extension quadriceps 5/5, tibialis anterior 5/5   L5 Right:  Extensor hallucis llongus 4/5,    L5 Left:  Extensor hallucis longus 5/5,    S1 Right:  Plantar flexion gastrocnemius 4/5   S1 Left:  Plantar flexion gastrocnemius 5/5    Neurological System   Ankle Reflex Right:  normal   Ankle Reflex Left: normal   Knee Reflex Right:  normal   Knee Reflex Left:  normal   Sensation on the Right:  L2 normal, L3 normal, L4 normal, L5 normal, S1 normal   Sensation on the Left:  L2 normal, L3 normal, L4 normal, L5 normal, S1 normal              Special Test on the Right:  Seated straight leg raising test negative, no clonus of the ankle              Special Test on the Left:  Seated straight leg raising test negative, no clonus of the ankle    Skin   Lumbosacral Spine:  Normal skin    Cardiovascular  System   Arterial Pulses Right:  Posterior tibialis normal, dorsalis pedis normal   Arterial Pulses Left:  Posterior tibialis normal, dorsalis pedis normal   Edema Right: None   Edema Left:  None

## 2023-09-27 ENCOUNTER — ANESTHESIA EVENT (OUTPATIENT)
Dept: SURGERY | Facility: HOSPITAL | Age: 18
End: 2023-09-27
Payer: COMMERCIAL

## 2023-09-27 ENCOUNTER — ANESTHESIA (OUTPATIENT)
Dept: SURGERY | Facility: HOSPITAL | Age: 18
End: 2023-09-27
Payer: COMMERCIAL

## 2023-09-27 ENCOUNTER — HOSPITAL ENCOUNTER (OUTPATIENT)
Facility: HOSPITAL | Age: 18
Discharge: HOME | End: 2023-09-27
Attending: ORTHOPAEDIC SURGERY | Admitting: ORTHOPAEDIC SURGERY
Payer: COMMERCIAL

## 2023-09-27 VITALS
SYSTOLIC BLOOD PRESSURE: 113 MMHG | HEART RATE: 73 BPM | DIASTOLIC BLOOD PRESSURE: 55 MMHG | TEMPERATURE: 98 F | RESPIRATION RATE: 16 BRPM | OXYGEN SATURATION: 100 %

## 2023-09-27 DIAGNOSIS — Z01.818 PRE-OPERATIVE EXAMINATION: ICD-10-CM

## 2023-09-27 DIAGNOSIS — M54.16 LUMBAR RADICULOPATHY: ICD-10-CM

## 2023-09-27 DIAGNOSIS — I10 HYPERTENSION: ICD-10-CM

## 2023-09-27 LAB
B-HCG UR QL: NEGATIVE
CTP QC/QA: YES
INDIRECT COOMBS: NORMAL
RH BLD: NORMAL
SPECIMEN OUTDATE: NORMAL

## 2023-09-27 PROCEDURE — 27000655: Performed by: ANESTHESIOLOGY

## 2023-09-27 PROCEDURE — 71000016 HC POSTOP RECOV ADDL HR: Performed by: ORTHOPAEDIC SURGERY

## 2023-09-27 PROCEDURE — 97162 PT EVAL MOD COMPLEX 30 MIN: CPT

## 2023-09-27 PROCEDURE — 63047 PR LAMINEC/FACETECT/FORAMIN,LUMBAR 1 SEG: ICD-10-PCS | Mod: 22,,, | Performed by: ORTHOPAEDIC SURGERY

## 2023-09-27 PROCEDURE — 27202344 HC EYESHIELD: Performed by: ANESTHESIOLOGY

## 2023-09-27 PROCEDURE — 27000165 HC TUBE, ETT CUFFED: Performed by: ANESTHESIOLOGY

## 2023-09-27 PROCEDURE — 71000033 HC RECOVERY, INTIAL HOUR: Performed by: ORTHOPAEDIC SURGERY

## 2023-09-27 PROCEDURE — 81025 URINE PREGNANCY TEST: CPT | Performed by: ORTHOPAEDIC SURGERY

## 2023-09-27 PROCEDURE — 27000510 HC BLANKET BAIR HUGGER ANY SIZE: Performed by: ANESTHESIOLOGY

## 2023-09-27 PROCEDURE — 63600175 PHARM REV CODE 636 W HCPCS: Performed by: ORTHOPAEDIC SURGERY

## 2023-09-27 PROCEDURE — 63047 LAM FACETEC & FORAMOT LUMBAR: CPT | Mod: 22,,, | Performed by: ORTHOPAEDIC SURGERY

## 2023-09-27 PROCEDURE — 63048 LAM FACETEC &FORAMOT EA ADDL: CPT | Mod: ,,, | Performed by: ORTHOPAEDIC SURGERY

## 2023-09-27 PROCEDURE — 36000710: Performed by: ORTHOPAEDIC SURGERY

## 2023-09-27 PROCEDURE — D9220A PRA ANESTHESIA: ICD-10-PCS | Mod: CRNA,,, | Performed by: NURSE ANESTHETIST, CERTIFIED REGISTERED

## 2023-09-27 PROCEDURE — 86900 BLOOD TYPING SEROLOGIC ABO: CPT | Performed by: ANESTHESIOLOGY

## 2023-09-27 PROCEDURE — 27000689 HC BLADE LARYNGOSCOPE ANY SIZE: Performed by: ANESTHESIOLOGY

## 2023-09-27 PROCEDURE — 97161 PT EVAL LOW COMPLEX 20 MIN: CPT

## 2023-09-27 PROCEDURE — D9220A PRA ANESTHESIA: ICD-10-PCS | Mod: ANES,,, | Performed by: ANESTHESIOLOGY

## 2023-09-27 PROCEDURE — D9220A PRA ANESTHESIA: Mod: ANES,,, | Performed by: ANESTHESIOLOGY

## 2023-09-27 PROCEDURE — 27201423 OPTIME MED/SURG SUP & DEVICES STERILE SUPPLY: Performed by: ORTHOPAEDIC SURGERY

## 2023-09-27 PROCEDURE — 63048 PR LAMINECT/FACETECT/FORAMINOT, EA ADDTL VERTEBRAL SEGM: ICD-10-PCS | Mod: ,,, | Performed by: ORTHOPAEDIC SURGERY

## 2023-09-27 PROCEDURE — 63600175 PHARM REV CODE 636 W HCPCS: Performed by: NURSE ANESTHETIST, CERTIFIED REGISTERED

## 2023-09-27 PROCEDURE — 36000711: Performed by: ORTHOPAEDIC SURGERY

## 2023-09-27 PROCEDURE — 25000003 PHARM REV CODE 250: Performed by: NURSE ANESTHETIST, CERTIFIED REGISTERED

## 2023-09-27 PROCEDURE — 63600175 PHARM REV CODE 636 W HCPCS: Performed by: ANESTHESIOLOGY

## 2023-09-27 PROCEDURE — 37000008 HC ANESTHESIA 1ST 15 MINUTES: Performed by: ORTHOPAEDIC SURGERY

## 2023-09-27 PROCEDURE — 37000009 HC ANESTHESIA EA ADD 15 MINS: Performed by: ORTHOPAEDIC SURGERY

## 2023-09-27 PROCEDURE — 97116 GAIT TRAINING THERAPY: CPT

## 2023-09-27 PROCEDURE — 97110 THERAPEUTIC EXERCISES: CPT

## 2023-09-27 PROCEDURE — 27000716 HC OXISENSOR PROBE, ANY SIZE: Performed by: ANESTHESIOLOGY

## 2023-09-27 PROCEDURE — 27000284 HC CANNULA NASAL: Performed by: ANESTHESIOLOGY

## 2023-09-27 PROCEDURE — 25000003 PHARM REV CODE 250: Performed by: ORTHOPAEDIC SURGERY

## 2023-09-27 PROCEDURE — D9220A PRA ANESTHESIA: Mod: CRNA,,, | Performed by: NURSE ANESTHETIST, CERTIFIED REGISTERED

## 2023-09-27 PROCEDURE — 71000015 HC POSTOP RECOV 1ST HR: Performed by: ORTHOPAEDIC SURGERY

## 2023-09-27 RX ORDER — OXYCODONE HYDROCHLORIDE 5 MG/1
5 TABLET ORAL
Status: DISCONTINUED | OUTPATIENT
Start: 2023-09-27 | End: 2023-09-27 | Stop reason: HOSPADM

## 2023-09-27 RX ORDER — CELECOXIB 100 MG/1
200 CAPSULE ORAL ONCE
Status: COMPLETED | OUTPATIENT
Start: 2023-09-27 | End: 2023-09-27

## 2023-09-27 RX ORDER — ONDANSETRON 2 MG/ML
4 INJECTION INTRAMUSCULAR; INTRAVENOUS DAILY PRN
Status: DISCONTINUED | OUTPATIENT
Start: 2023-09-27 | End: 2023-09-27 | Stop reason: HOSPADM

## 2023-09-27 RX ORDER — MUPIROCIN 20 MG/G
OINTMENT TOPICAL
Status: DISCONTINUED | OUTPATIENT
Start: 2023-09-27 | End: 2023-09-27 | Stop reason: HOSPADM

## 2023-09-27 RX ORDER — VANCOMYCIN HYDROCHLORIDE 1 G/20ML
INJECTION, POWDER, LYOPHILIZED, FOR SOLUTION INTRAVENOUS
Status: DISCONTINUED | OUTPATIENT
Start: 2023-09-27 | End: 2023-09-27 | Stop reason: HOSPADM

## 2023-09-27 RX ORDER — FAMOTIDINE 20 MG/1
20 TABLET, FILM COATED ORAL 2 TIMES DAILY
Status: DISCONTINUED | OUTPATIENT
Start: 2023-09-27 | End: 2023-09-27 | Stop reason: HOSPADM

## 2023-09-27 RX ORDER — TRANEXAMIC ACID 10 MG/ML
1000 INJECTION, SOLUTION INTRAVENOUS
Status: COMPLETED | OUTPATIENT
Start: 2023-09-27 | End: 2023-09-27

## 2023-09-27 RX ORDER — EPINEPHRINE CONVENIENCE KIT 1 MG/ML(1)
KIT INTRAMUSCULAR; SUBCUTANEOUS
Status: DISCONTINUED | OUTPATIENT
Start: 2023-09-27 | End: 2023-09-27 | Stop reason: HOSPADM

## 2023-09-27 RX ORDER — ONDANSETRON 2 MG/ML
INJECTION INTRAMUSCULAR; INTRAVENOUS
Status: DISCONTINUED | OUTPATIENT
Start: 2023-09-27 | End: 2023-09-27

## 2023-09-27 RX ORDER — GLYCOPYRROLATE 0.2 MG/ML
INJECTION INTRAMUSCULAR; INTRAVENOUS
Status: DISCONTINUED | OUTPATIENT
Start: 2023-09-27 | End: 2023-09-27

## 2023-09-27 RX ORDER — SODIUM CHLORIDE 0.9 % (FLUSH) 0.9 %
10 SYRINGE (ML) INJECTION
Status: DISCONTINUED | OUTPATIENT
Start: 2023-09-27 | End: 2023-09-27 | Stop reason: HOSPADM

## 2023-09-27 RX ORDER — MORPHINE SULFATE 10 MG/ML
2 INJECTION INTRAMUSCULAR; INTRAVENOUS; SUBCUTANEOUS
Status: DISCONTINUED | OUTPATIENT
Start: 2023-09-27 | End: 2023-09-27 | Stop reason: HOSPADM

## 2023-09-27 RX ORDER — OXYCODONE HYDROCHLORIDE 5 MG/1
10 TABLET ORAL EVERY 4 HOURS PRN
Status: DISCONTINUED | OUTPATIENT
Start: 2023-09-27 | End: 2023-09-27 | Stop reason: HOSPADM

## 2023-09-27 RX ORDER — MIDAZOLAM HYDROCHLORIDE 1 MG/ML
INJECTION INTRAMUSCULAR; INTRAVENOUS
Status: DISCONTINUED | OUTPATIENT
Start: 2023-09-27 | End: 2023-09-27

## 2023-09-27 RX ORDER — MEPERIDINE HYDROCHLORIDE 25 MG/ML
25 INJECTION INTRAMUSCULAR; INTRAVENOUS; SUBCUTANEOUS EVERY 10 MIN PRN
Status: DISCONTINUED | OUTPATIENT
Start: 2023-09-27 | End: 2023-09-27 | Stop reason: HOSPADM

## 2023-09-27 RX ORDER — DEXAMETHASONE SODIUM PHOSPHATE 4 MG/ML
INJECTION, SOLUTION INTRA-ARTICULAR; INTRALESIONAL; INTRAMUSCULAR; INTRAVENOUS; SOFT TISSUE
Status: DISCONTINUED | OUTPATIENT
Start: 2023-09-27 | End: 2023-09-27

## 2023-09-27 RX ORDER — ONDANSETRON 2 MG/ML
4 INJECTION INTRAMUSCULAR; INTRAVENOUS EVERY 12 HOURS PRN
Status: DISCONTINUED | OUTPATIENT
Start: 2023-09-27 | End: 2023-09-27 | Stop reason: HOSPADM

## 2023-09-27 RX ORDER — ONDANSETRON 4 MG/1
8 TABLET, ORALLY DISINTEGRATING ORAL EVERY 8 HOURS PRN
Status: DISCONTINUED | OUTPATIENT
Start: 2023-09-27 | End: 2023-09-27 | Stop reason: HOSPADM

## 2023-09-27 RX ORDER — LIDOCAINE HYDROCHLORIDE 20 MG/ML
INJECTION, SOLUTION EPIDURAL; INFILTRATION; INTRACAUDAL; PERINEURAL
Status: DISCONTINUED | OUTPATIENT
Start: 2023-09-27 | End: 2023-09-27

## 2023-09-27 RX ORDER — SODIUM CHLORIDE 9 MG/ML
INJECTION, SOLUTION INTRAVENOUS CONTINUOUS
Status: DISCONTINUED | OUTPATIENT
Start: 2023-09-27 | End: 2023-09-27 | Stop reason: HOSPADM

## 2023-09-27 RX ORDER — FENTANYL CITRATE 50 UG/ML
INJECTION, SOLUTION INTRAMUSCULAR; INTRAVENOUS
Status: DISCONTINUED | OUTPATIENT
Start: 2023-09-27 | End: 2023-09-27

## 2023-09-27 RX ORDER — OXYCODONE HCL 10 MG/1
10 TABLET, FILM COATED, EXTENDED RELEASE ORAL ONCE
Status: COMPLETED | OUTPATIENT
Start: 2023-09-27 | End: 2023-09-27

## 2023-09-27 RX ORDER — ACETAMINOPHEN 325 MG/1
650 TABLET ORAL EVERY 4 HOURS PRN
Status: DISCONTINUED | OUTPATIENT
Start: 2023-09-27 | End: 2023-09-27 | Stop reason: HOSPADM

## 2023-09-27 RX ORDER — GABAPENTIN 300 MG/1
600 CAPSULE ORAL ONCE
Status: COMPLETED | OUTPATIENT
Start: 2023-09-27 | End: 2023-09-27

## 2023-09-27 RX ORDER — LIDOCAINE HYDROCHLORIDE 10 MG/ML
1 INJECTION INFILTRATION; PERINEURAL ONCE
Status: DISCONTINUED | OUTPATIENT
Start: 2023-09-27 | End: 2023-09-27 | Stop reason: HOSPADM

## 2023-09-27 RX ORDER — HYDROMORPHONE HYDROCHLORIDE 2 MG/ML
0.5 INJECTION, SOLUTION INTRAMUSCULAR; INTRAVENOUS; SUBCUTANEOUS EVERY 5 MIN PRN
Status: DISCONTINUED | OUTPATIENT
Start: 2023-09-27 | End: 2023-09-27 | Stop reason: HOSPADM

## 2023-09-27 RX ORDER — DIPHENHYDRAMINE HYDROCHLORIDE 50 MG/ML
25 INJECTION INTRAMUSCULAR; INTRAVENOUS EVERY 6 HOURS PRN
Status: DISCONTINUED | OUTPATIENT
Start: 2023-09-27 | End: 2023-09-27 | Stop reason: HOSPADM

## 2023-09-27 RX ORDER — PROPOFOL 10 MG/ML
VIAL (ML) INTRAVENOUS
Status: DISCONTINUED | OUTPATIENT
Start: 2023-09-27 | End: 2023-09-27

## 2023-09-27 RX ORDER — ROCURONIUM BROMIDE 10 MG/ML
INJECTION, SOLUTION INTRAVENOUS
Status: DISCONTINUED | OUTPATIENT
Start: 2023-09-27 | End: 2023-09-27

## 2023-09-27 RX ORDER — SODIUM CHLORIDE, SODIUM LACTATE, POTASSIUM CHLORIDE, CALCIUM CHLORIDE 600; 310; 30; 20 MG/100ML; MG/100ML; MG/100ML; MG/100ML
INJECTION, SOLUTION INTRAVENOUS CONTINUOUS
Status: DISCONTINUED | OUTPATIENT
Start: 2023-09-27 | End: 2023-09-27 | Stop reason: HOSPADM

## 2023-09-27 RX ORDER — MORPHINE SULFATE 10 MG/ML
4 INJECTION INTRAMUSCULAR; INTRAVENOUS; SUBCUTANEOUS EVERY 5 MIN PRN
Status: DISCONTINUED | OUTPATIENT
Start: 2023-09-27 | End: 2023-09-27 | Stop reason: HOSPADM

## 2023-09-27 RX ORDER — SODIUM CHLORIDE, SODIUM LACTATE, POTASSIUM CHLORIDE, CALCIUM CHLORIDE 600; 310; 30; 20 MG/100ML; MG/100ML; MG/100ML; MG/100ML
125 INJECTION, SOLUTION INTRAVENOUS CONTINUOUS
Status: DISCONTINUED | OUTPATIENT
Start: 2023-09-27 | End: 2023-09-27 | Stop reason: HOSPADM

## 2023-09-27 RX ADMIN — LIDOCAINE HYDROCHLORIDE 100 MG: 20 INJECTION, SOLUTION INTRAVENOUS at 10:09

## 2023-09-27 RX ADMIN — FENTANYL CITRATE 100 MCG: 50 INJECTION INTRAMUSCULAR; INTRAVENOUS at 10:09

## 2023-09-27 RX ADMIN — ONDANSETRON 4 MG: 2 INJECTION INTRAMUSCULAR; INTRAVENOUS at 01:09

## 2023-09-27 RX ADMIN — DEXMEDETOMIDINE: 100 INJECTION, SOLUTION, CONCENTRATE INTRAVENOUS at 11:09

## 2023-09-27 RX ADMIN — ONDANSETRON 8 MG: 4 TABLET, ORALLY DISINTEGRATING ORAL at 02:09

## 2023-09-27 RX ADMIN — SUGAMMADEX 200 MG: 100 INJECTION, SOLUTION INTRAVENOUS at 01:09

## 2023-09-27 RX ADMIN — SODIUM CHLORIDE: 9 INJECTION, SOLUTION INTRAVENOUS at 07:09

## 2023-09-27 RX ADMIN — OXYCODONE HYDROCHLORIDE 10 MG: 5 TABLET ORAL at 02:09

## 2023-09-27 RX ADMIN — GLYCOPYRROLATE 0.2 MG: 0.2 INJECTION INTRAMUSCULAR; INTRAVENOUS at 11:09

## 2023-09-27 RX ADMIN — TRANEXAMIC ACID 1000 MG: 10 INJECTION, SOLUTION INTRAVENOUS at 11:09

## 2023-09-27 RX ADMIN — ONDANSETRON 4 MG: 2 INJECTION INTRAMUSCULAR; INTRAVENOUS at 11:09

## 2023-09-27 RX ADMIN — MIDAZOLAM HYDROCHLORIDE 2 MG: 1 INJECTION, SOLUTION INTRAMUSCULAR; INTRAVENOUS at 10:09

## 2023-09-27 RX ADMIN — PROPOFOL 200 MG: 10 INJECTION, EMULSION INTRAVENOUS at 10:09

## 2023-09-27 RX ADMIN — OXYCODONE HYDROCHLORIDE 10 MG: 10 TABLET, FILM COATED, EXTENDED RELEASE ORAL at 10:09

## 2023-09-27 RX ADMIN — GABAPENTIN 600 MG: 300 CAPSULE ORAL at 10:09

## 2023-09-27 RX ADMIN — ROCURONIUM BROMIDE 15 MG: 10 INJECTION, SOLUTION INTRAVENOUS at 11:09

## 2023-09-27 RX ADMIN — ROCURONIUM BROMIDE 40 MG: 10 INJECTION, SOLUTION INTRAVENOUS at 10:09

## 2023-09-27 RX ADMIN — CELECOXIB 200 MG: 100 CAPSULE ORAL at 10:09

## 2023-09-27 RX ADMIN — FENTANYL CITRATE 50 MCG: 50 INJECTION INTRAMUSCULAR; INTRAVENOUS at 01:09

## 2023-09-27 RX ADMIN — ROCURONIUM BROMIDE 10 MG: 10 INJECTION, SOLUTION INTRAVENOUS at 11:09

## 2023-09-27 RX ADMIN — DEXAMETHASONE SODIUM PHOSPHATE 12 MG: 4 INJECTION, SOLUTION INTRA-ARTICULAR; INTRALESIONAL; INTRAMUSCULAR; INTRAVENOUS; SOFT TISSUE at 11:09

## 2023-09-27 RX ADMIN — CEFAZOLIN 2 G: 2 INJECTION, POWDER, FOR SOLUTION INTRAMUSCULAR; INTRAVENOUS at 11:09

## 2023-09-27 RX ADMIN — SODIUM CHLORIDE: 9 INJECTION, SOLUTION INTRAVENOUS at 11:09

## 2023-09-27 NOTE — OR NURSING
1327 Received to pacu in stable condition with NC @ 2lpm.    1352  Room air    1358 Patient nauseous, Zofran 4mg given with good result    1400 Patient transferred back to OP room 17 in stable condition.  BS report to DON Belcher RN @ 1410 .  VSS @ 104/43, 98%RA, 67HR and 14RR . Family at BS.

## 2023-09-27 NOTE — ANESTHESIA PREPROCEDURE EVALUATION
09/27/2023  Mallory Rojo is a 17 y.o., female.      Pre-op Assessment    I have reviewed the Patient Summary Reports.     I have reviewed the Nursing Notes. I have reviewed the NPO Status.   I have reviewed the Medications.     Review of Systems  Anesthesia Hx:  No problems with previous Anesthesia    Social:  Non-Smoker, No Alcohol Use    Hematology/Oncology:  Hematology Normal   Oncology Normal     EENT/Dental:EENT/Dental Normal   Cardiovascular:  Cardiovascular Normal     Pulmonary:   Asthma    Renal/:  Renal/ Normal     Hepatic/GI:   GERD    Musculoskeletal:  Musculoskeletal Normal    Neurological:  Neurology Normal    Endocrine:  Endocrine Normal  Obesity / BMI > 30  Dermatological:  Skin Normal    Psych:  Psychiatric Normal           Physical Exam  General: Well nourished    Airway:  Mallampati: III / III  Mouth Opening: Normal  TM Distance: > 6 cm  Tongue: Normal  Neck ROM: Normal ROM    Chest/Lungs:  Clear to auscultation, Normal Respiratory Rate    Heart:  Rate: Normal  Rhythm: Regular Rhythm        Anesthesia Plan  Type of Anesthesia, risks & benefits discussed:    Anesthesia Type: Gen ETT  Intra-op Monitoring Plan: Standard ASA Monitors  Post Op Pain Control Plan: multimodal analgesia  Induction:  IV  Informed Consent: Informed consent signed with the Patient and all parties understand the risks and agree with anesthesia plan.  All questions answered.   ASA Score: 2  Day of Surgery Review of History & Physical: H&P Update referred to the surgeon/provider.I have interviewed and examined the patient. I have reviewed the patient's H&P dated: There are no significant changes. H&P completed by Anesthesiologist.    Ready For Surgery From Anesthesia Perspective.     .

## 2023-09-27 NOTE — PT/OT/SLP EVAL
"Physical Therapy Evaluation and Treatment    Patient Name: Mallory Rojo   MRN: [id number]  Recent Surgery: Procedure(s) (LRB):  L2-L5 Laminectomy (N/A) Day of Surgery    Recommendations:     Discharge Recommendations: home, outpatient PT   Discharge Equipment Recommendations: none   Barriers to discharge: None    Assessment:     Mallory Rojo is a 17 y.o. female admitted with a medical diagnosis of <principal problem not specified>. She presents with the following impairments/functional limitations:  pain. Pt was alert and eager to participate in PT evaluation. Pt reported "not really pain but sore" at surgical incision site. Pt discussed d/c home with assistance from family and significant other.     Rehab Prognosis: Good; patient would benefit from acute PT services to address these deficits and reach maximum level of function.    Plan:     During this hospitalization, patient to be seen   to address the above listed problems via      Plan of Care Expires: 09/27/23    Subjective     Chief Complaint: Lumbar radiculopathy  Patient Comments/Goals: d/c home  Pain/Comfort:  Pain Rating 1: 7/10  Location - Orientation 1: lower  Location 1: back  Pain Addressed 1: Distraction, Cessation of Activity, Reposition  Pain Rating Post-Intervention 1: 7/10    Social History:  Living Environment: Patient lives with their significant other and parent(s) in a single story home with number of outside stair(s): 5 w/ bilateral handrails for assistance.  Prior Level of Function: Prior to admission, patient was independent with increased time and effort due to lumbar pain.  Equipment Used at Home: none  DME owned (not currently used): standard walker, single point cane, and wheelchair  Assistance Upon Discharge: significant other and family    Objective:     Communicated with [place] prior to session. Patient found left sidelying with HOB elevated with peripheral IV upon PT entry to room.    General Precautions: Standard,   "   Orthopedic Precautions: spinal precautions   Braces: N/A    Respiratory Status: Room air    Exams:  RLE ROM: WFL  RLE Strength: WFL  LLE ROM: WFL  LLE Strength: WFL  Cognitive: Patient is oriented to Person, Place, Time, Situation  Sensation:    -       Intact    Functional Mobility:  Gait belt applied - Yes  Bed Mobility  Rolling Left: stand by assistance  Rolling Right: stand by assistance  Supine to Sit: stand by assistance for log rolling technique and maintenance of precautions  Transfers  Sit to Stand: stand by assistance with no AD  Gait  Patient ambulated 50ftx2 with no AD and stand by assistance. Patient required cues for upright posture to increase independence and safety. Patient required cues ~ 25% of the time.  Balance  Sitting: GOOD: Maintains balance through MODERATE excursions of active trunk movement  Standing: FAIR+: Needs CLOSE SUPERVISION during gait and is able to right self with minor LOB  Stairs: Pt ascended/descended 5 stair(s) with No Assistive Device with bilateral handrails with Stand-by Assistance.     Therapeutic Activities and Exercises:  Patient educated on role of acute care PT and PT POC, safety while in hospital including calling nurse for mobility, and call light usage.  Educated on spinal precautions including avoidance of bending, lifting, and twisting. Patient expressed understanding. Educated on log roll technique, performed to left.  Educated about importance of OOB mobility and remaining up in chair most of the day.  Patient educated in:  -PT role and POC  -spinal precautions (no bending, lifting, or twisting)  -log rolling during bed mobility  -safety with transfers including hand placement  -gait sequence and RW management  -OOB activity to maximize recovery including getting on a daily walking program at home   -ambulating in hallway 3x's/day with nursing staff assistance     Pt instructed in BLE ex for HEP x 30 each consisting of AP, QS, GS, HS< ABD/ADD, SLR, bridging  with intermittent resting.     AM-PAC 6 CLICK MOBILITY  Total Score:22    Patient left sitting edge of bed with all lines intact, call button in reach, and RN notified.    GOALS:   Multidisciplinary Problems       Physical Therapy Goals       Not on file              Multidisciplinary Problems (Resolved)          Problem: Physical Therapy    Goal Priority Disciplines Outcome Goal Variances Interventions   Physical Therapy Goal   (Resolved)     PT, PT/OT Met     Description: Short Term Goals to be met by: 2023    Patient will increase functional independence with mobility by performin. Supine to sit with Stand by assist  2. Sit to stand transfer with Stand by assist using no assistive device  3. Bed to chair transfer with Stand by assist using no assistive device  4. Gait  x 100 feet with Stand by assist using no assistive device  5. Lower extremity exercise program x30 reps per handout, with assistance as needed  6. Pt able to negotiate 5 steps with stand by assistance using bilateral handrails.    Long Term Goals to be met by: 2023    Pt will regain full independent functional mobility with no assistive device to return to home situation and prior activities of daily living.                           History:     Past Medical History:   Diagnosis Date    Anxiety     Asthma     Obesity        Past Surgical History:   Procedure Laterality Date    BACK SURGERY      TONSILLECTOMY         Time Tracking:     PT Received On: 23  PT Start Time: 1430  PT Stop Time: 1453  PT Total Time (min): 23 min     Billable Minutes: Evaluation moderate complexity 15 and Therapeutic Exercise 8    2023

## 2023-09-27 NOTE — ANESTHESIA POSTPROCEDURE EVALUATION
Anesthesia Post Evaluation    Patient: Mallory Rojo    Procedure(s) Performed: Procedure(s) (LRB):  L2-L5 Laminectomy (N/A)    Final Anesthesia Type: general      Patient location during evaluation: PACU  Patient participation: Yes- Able to Participate  Level of consciousness: awake and sedated  Post-procedure vital signs: reviewed and stable  Pain management: adequate  Airway patency: patent    PONV status at discharge: No PONV  Anesthetic complications: no      Cardiovascular status: blood pressure returned to baseline  Respiratory status: unassisted  Hydration status: euvolemic  Follow-up not needed.          Vitals Value Taken Time   /55 09/27/23 1449   Temp 36.8 °C (98.2 °F) 09/27/23 1330   Pulse 73 09/27/23 1431   Resp 16 09/27/23 1431   SpO2 100 % 09/27/23 1431         Event Time   Out of Recovery 14:05:00         Pain/Ashu Score: Pain Rating Prior to Med Admin: 2 (9/27/2023  2:31 PM)  Ashu Score: 10 (9/27/2023  2:49 PM)  Modified Ashu Score: 19 (9/27/2023  2:49 PM)

## 2023-09-27 NOTE — ANESTHESIA PROCEDURE NOTES
Intubation    Date/Time: 9/27/2023 10:58 AM    Performed by: Hamilton Andrew Jr., CRNA  Authorized by: Bar Carrera MD    Intubation:     Induction:  Intravenous    Intubated:  Postinduction    Mask Ventilation:  Easy mask    Attempts:  1    Attempted By:  CRNA    Method of Intubation:  Direct    Blade:  Mechelle 3    Laryngeal View Grade: Grade IIA - cords partially seen      Difficult Airway Encountered?: No      Complications:  None    Airway Device:  Oral endotracheal tube    Airway Device Size:  7.5    Style/Cuff Inflation:  Cuffed (inflated to minimal occlusive pressure)    Tube secured:  21    Secured at:  The lips    Placement Verified By:  Capnometry    Complicating Factors:  None    Findings Post-Intubation:  Atraumatic/condition of teeth unchanged

## 2023-09-27 NOTE — DISCHARGE SUMMARY
[facility] - Periop Services  Discharge Note  Short Stay    Procedure(s) (LRB):  L2-L5 Laminectomy (N/A)      OUTCOME: Patient tolerated treatment/procedure well without complication and is now ready for discharge.    DISPOSITION: Home or Self Care    FINAL DIAGNOSIS:  <principal problem not specified>    FOLLOWUP: In clinic    DISCHARGE INSTRUCTIONS:    Discharge Procedure Orders   Diet general     Other restrictions (specify):   Order Comments: No heavy lifting or strenuous activity     Call MD for:  temperature >100.4     Call MD for:  persistent nausea and vomiting     Call MD for:  severe uncontrolled pain     Call MD for:  difficulty breathing, headache or visual disturbances     Call MD for:  redness, tenderness, or signs of infection (pain, swelling, redness, odor or green/yellow discharge around incision site)     Call MD for:  hives     Call MD for:  persistent dizziness or light-headedness     Call MD for:  extreme fatigue

## 2023-09-27 NOTE — PLAN OF CARE
Problem: Physical Therapy  Goal: Physical Therapy Goal  Description: Short Term Goals to be met by: 2023    Patient will increase functional independence with mobility by performin. Supine to sit with Stand by assist  2. Sit to stand transfer with Stand by assist using no assistive device  3. Bed to chair transfer with Stand by assist using no assistive device  4. Gait  x 100 feet with Stand by assist using no assistive device  5. Lower extremity exercise program x30 reps per handout, with assistance as needed  6. Pt able to negotiate 5 steps with stand by assistance using bilateral handrails.    Long Term Goals to be met by: 2023    Pt will regain full independent functional mobility with no assistive device to return to home situation and prior activities of daily living.      Outcome: Met  PT evaluation completed. See evaluation note for details.

## 2023-09-27 NOTE — DISCHARGE INSTRUCTIONS
Office: [phone]      Spine Discharge Instructions    - Follow up with Dr. Phillips in 10-14 days. Please call for appointment (if not already scheduled).   - Keep dressing clean and dry. May remove dressing 3 days after surgery  - May shower upon discharge. Do not scrub, tub bathe, or submerge the incision.  - No lifting greater than 10 pounds.  - Ambulate multiple times each day   - You may bend and twist for usual daily activities  - You may sleep in any position that is comfortable  - Do not drive a motor vehicle while on narcotic pain medication.   - Utilize pain medication (narcotics) as prescribed  - Do not exceed 3g Tylenol in a 24 hour period.   - Use stool softeners while taking narcotics to prevent constipation   - Resume your usual diet     Ankle Pump        Bend ankles up and down, alternating feet.  Repeat 30 times. Do 2 sessions per day.       Quad Set        Slowly tighten muscles on thigh of right straight leg while counting out loud to 5 .   Repeat 30 times. Repeat on the left 30 times. Do 2 sessions per day.           Gluteal Sets        Squeeze pelvic floor and hold. Tighten bottom. Repeat 30 times. Do 2 sessions a day.           Heel Slide        Bend knee and pull right heel toward buttocks. Straighten out the leg.  Repeat 30 times. Repeat on the left 30 times. Do 2 sessions per day.           Hip Abduction / Adduction: with Extended Knee (Supine)        Bring right leg out to side and return to midline position. Keep knee straight.  Repeat 30 times. Repeat on the left 30 times. Do 2 sessions per day.            Straight Leg Raise        Bend left leg. Raise right leg 8-12 inches with knee locked. Exhale and tighten thigh muscles while raising leg.   Repeat 30 times. Switch legs and repeat 30 times. Do 2 sessions per day.           Bridging        Lie on back with feet shoulder width apart. Lift hips toward the ceiling. Hold 5 seconds.  Repeat 30 times. Do 2 sessions per day.         KNEE:  Extension, Long Arc Quads - Sitting        Raise right foot until knee is straight. Return foot to the floor.  Repeat 30 times. Repeat on the left 30 times. Do 2 sessions per day.       Copyright © [employer]. All rights reserved.    Physical Therapy Instructions    Complete the exercises standing at the kitchen sink for stability. Work up to 30 repetitions each 2 times a day. Take rest breaks as needed and keep a chair close by to sit down if needed.      Toe/Heel Raises            Stand while holding a stable object. Rise up on toes. Then rock back on heels. Hold each position 2 seconds.  Repeat 30 times per session. Do 2 sessions per day.            Squat: Double Leg (Supported)        With feet shoulder width apart, hold support. Squat, keeping lower leg vertical, knee in line with second toe. Use legs, do not pull up and down with arms. Only squat down as low as you are comfortable. Repeat 30 times with rest breaks as needed. Do 2 sessions per day         Hip Flexion (Standing)        Stand with support. Lift right knee upward. Repeat 30 times. Repeat with other leg.   Do 2 sessions per day.      HIP: Abduction - Standing        Lift one leg out to the side holding on to a support. Keep toe pointing forward to focus the exercise on the muscles on the outside of your hip.  Repeat 30 times per side. 2 times per day

## 2023-09-27 NOTE — OP NOTE
[facility] - Periop Services  Surgery Department  Operative Note    SUMMARY     Date of Procedure: 9/27/2023     Procedure: Procedure(s) (LRB):  L2-L5 Laminectomy (N/A)     Surgeon(s) and Role:     * Davie Phillips MD - Primary    Assistant: None    Pre-Operative Diagnosis: Lumbar radiculopathy [M54.16]    Post-Operative Diagnosis: Post-Op Diagnosis Codes:     * Lumbar radiculopathy [M54.16]    Anesthesia: General    Technical Procedures Used:   Laminectomy for decompression of central canal, lateral recess, neural foramen L2-L5    Justification for 22 modifier:   Given her scar tissue from prior L3-4 and L4-5 hemilaminotomy as well as her morbid obesity this case took greater than 50% longer than a primary case in a patient with normal BMI would have.    Description of the Findings of the Procedure:   Indications:  This is a 17 y.o. female with lumbar stenosis with claudication and radiculopathy.  She is status post prior left L3-4 and L4-5 hemilaminotomy.  She has congenital stenosis lumbar spine.  They failed attempted conservative treatement.  Risks, benefits, and alternatives were discussed with the patient and they elected to proceed with surgery.    Procedure in detail:  The patient was identified in the preoperative holding area and the surgical site was marked. They were then taken back to the operating room where general endotracheal anesthesia was induced. They were then transitioned to the prone position on the Magan frame with the arms and legs in the physiologic position and all bony prominences well-padded. The posterior lumbar spine was prepped and draped in the normal sterile fashion. A timeout was performed. The incision site was localized with intraoperative fluoroscopy. After the incision was marked out a solution of epinephrine was injected in the skin and subcutaneous tissues.  An erector spinae block was performed bilaterally at L3. A posterior midline incision was made. This  was carried down to the fascia which was divided in line with the incision. A subperiosteal dissection was performed exposing the posterior elements of L2-L5.    Laminectomy was performed at L2-L5 with decompression of the central canal, lateral recess, neural foramen.  The lamina of L2-L5 were removed with the use of Leksell rongeur and high-speed bur and Kerrison punch.  Extensive scar tissue around the dura was noted on the left at the L3-4 and L4-5 levels from prior hemilaminotomy.  Medial facetectomy and foraminotomy was performed bilaterally at each aforementioned level. The lateral recesses were decompressed bilaterally with resection of overgrown facet joints with the use of the Kerrison punch. The nerve roots were followed out into the foramen and decompressed in the foramen with the Kerrison. The nerve roots were palpated and felt to be free of any compression.  The central canal, lateral recesses, neural foramen were noted to be adequately decompressed.    The wound was copiously irrigated with normal saline.  The fascial layer was closed with interrupted figure-of-eight #1 Vicryl suture and a running #1 Stratafix symmetric suture. The subcutaneous layer was closed with a running Stratafix #0 suture. A running subcuticular layer was performed with 2-0 Stratafix suture. Dermabond prineo was applied to cover the incision. A sterile dressing of gauze and Tegaderm was then applied.    The patient was then transitioned back to supine position, extubated and awakened and taken to recovery in good condition having suffered no untoward event.      Complications: No    Estimated Blood Loss (EBL): 150 mL           Implants: * No implants in log *    Specimens:   Specimen (24h ago, onward)      None                    Condition: Good    Disposition: PACU - hemodynamically stable.    Attestation: I was present and scrubbed for the entire procedure.

## 2023-09-27 NOTE — TRANSFER OF CARE
Anesthesia Transfer of Care Note    Patient: Mallory Rojo    Procedure(s) Performed: Procedure(s) (LRB):  L2-L5 Laminectomy (N/A)    Patient location: PACU    Anesthesia Type: general    Transport from OR: Transported from OR on 2-3 L/min O2 by NC with adequate spontaneous ventilation    Post pain: adequate analgesia    Post assessment: no apparent anesthetic complications and tolerated procedure well    Post vital signs: stable    Level of consciousness: alert and responds to stimulation    Nausea/Vomiting: no nausea/vomiting    Complications: none    Transfer of care protocol was followed      Last vitals:   Visit Vitals  BP (!) 113/56   Pulse 77   Temp 36.8 °C (98.2 °F) (Oral)   Resp 18   SpO2 95%   Breastfeeding No

## 2023-10-06 DIAGNOSIS — J30.89 NON-SEASONAL ALLERGIC RHINITIS, UNSPECIFIED TRIGGER: ICD-10-CM

## 2023-10-06 DIAGNOSIS — J45.909 ASTHMA, UNSPECIFIED ASTHMA SEVERITY, UNSPECIFIED WHETHER COMPLICATED, UNSPECIFIED WHETHER PERSISTENT: ICD-10-CM

## 2023-10-06 RX ORDER — CETIRIZINE HYDROCHLORIDE 10 MG/1
10 TABLET ORAL DAILY
Qty: 30 TABLET | Refills: 0 | Status: SHIPPED | OUTPATIENT
Start: 2023-10-06 | End: 2023-11-14 | Stop reason: SDUPTHER

## 2023-10-06 RX ORDER — ALBUTEROL SULFATE 90 UG/1
2 AEROSOL, METERED RESPIRATORY (INHALATION) EVERY 4 HOURS PRN
Qty: 36 G | Refills: 0 | Status: SHIPPED | OUTPATIENT
Start: 2023-10-06 | End: 2023-11-14 | Stop reason: SDUPTHER

## 2023-10-06 RX ORDER — MONTELUKAST SODIUM 10 MG/1
10 TABLET ORAL NIGHTLY
Qty: 30 TABLET | Refills: 0 | Status: SHIPPED | OUTPATIENT
Start: 2023-10-06 | End: 2023-11-14 | Stop reason: SDUPTHER

## 2023-10-10 DIAGNOSIS — Z01.818 PRE-OPERATIVE EXAMINATION: ICD-10-CM

## 2023-10-10 RX ORDER — OXYCODONE AND ACETAMINOPHEN 5; 325 MG/1; MG/1
1 TABLET ORAL EVERY 4 HOURS PRN
Qty: 45 TABLET | Refills: 0 | Status: SHIPPED | OUTPATIENT
Start: 2023-10-10

## 2023-10-12 DIAGNOSIS — Z09 FOLLOW-UP EXAMINATION AFTER ORTHOPEDIC SURGERY: Primary | ICD-10-CM

## 2023-10-13 ENCOUNTER — HOSPITAL ENCOUNTER (OUTPATIENT)
Dept: RADIOLOGY | Facility: HOSPITAL | Age: 18
Discharge: HOME | End: 2023-10-13
Attending: ORTHOPAEDIC SURGERY
Payer: COMMERCIAL

## 2023-10-13 DIAGNOSIS — Z09 FOLLOW-UP EXAMINATION AFTER ORTHOPEDIC SURGERY: ICD-10-CM

## 2023-10-13 PROCEDURE — 72100 X-RAY EXAM L-S SPINE 2/3 VWS: CPT | Mod: 26,,, | Performed by: ORTHOPAEDIC SURGERY

## 2023-10-13 PROCEDURE — 72100 XR LUMBAR SPINE AP AND LATERAL: ICD-10-PCS | Mod: 26,,, | Performed by: ORTHOPAEDIC SURGERY

## 2023-10-13 PROCEDURE — 72100 X-RAY EXAM L-S SPINE 2/3 VWS: CPT | Mod: TC

## 2023-10-19 DIAGNOSIS — Z09 FOLLOW-UP EXAMINATION AFTER ORTHOPEDIC SURGERY: Primary | ICD-10-CM

## 2023-10-23 ENCOUNTER — OFFICE VISIT (OUTPATIENT)
Dept: SPINE | Facility: CLINIC | Age: 18
End: 2023-10-23
Payer: COMMERCIAL

## 2023-10-23 DIAGNOSIS — M48.062 LUMBAR STENOSIS WITH NEUROGENIC CLAUDICATION: ICD-10-CM

## 2023-10-23 DIAGNOSIS — M54.16 LUMBAR RADICULOPATHY: Primary | ICD-10-CM

## 2023-10-23 PROCEDURE — 3044F HG A1C LEVEL LT 7.0%: CPT | Mod: CPTII,,, | Performed by: ORTHOPAEDIC SURGERY

## 2023-10-23 PROCEDURE — 99212 OFFICE O/P EST SF 10 MIN: CPT | Mod: PBBFAC | Performed by: ORTHOPAEDIC SURGERY

## 2023-10-23 PROCEDURE — 3044F PR MOST RECENT HEMOGLOBIN A1C LEVEL <7.0%: ICD-10-PCS | Mod: CPTII,,, | Performed by: ORTHOPAEDIC SURGERY

## 2023-10-23 PROCEDURE — 99024 POSTOP FOLLOW-UP VISIT: CPT | Mod: ,,, | Performed by: ORTHOPAEDIC SURGERY

## 2023-10-23 PROCEDURE — 99024 PR POST-OP FOLLOW-UP VISIT: ICD-10-PCS | Mod: ,,, | Performed by: ORTHOPAEDIC SURGERY

## 2023-10-23 NOTE — PROGRESS NOTES
AP, lateral views of the lumbar spine reviewed    On the AP there is normal coronal alignment.  There are 5 non-rib-bearing lumbar vertebrae.  On the lateral there is maintained lumbar lordosis.  No fractures or listhesis noted.  Status post L2-L5 laminectomy.    Impression:  L2-L5 laminectomy

## 2023-10-23 NOTE — PROGRESS NOTES
MDM/time:  postop    ASSESSMENT:  17 y.o. female with lumbar stenosis and radiculopathy now status post L2-L5 laminectomy 09/27/2023    PLAN:  Physical therapy.  Follow-up in 3 months    HPI:  17 y.o. female here for evaluation of lumbar stenosis and radiculopathy now status post L2-L5 laminectomy 09/27/2023.  Reports she is been doing well.  Her preoperative pain is improved.  She does have some soreness in the lower back.  Also complains of some numbness in the right L5 distribution in the anterior tibia.    IMAGING:  X-rays lumbar spine reviewed show:   On the AP there is normal coronal alignment.  There are 5 non-rib-bearing lumbar vertebrae.  On the lateral there is maintained lumbar lordosis.  No fractures or listhesis noted.  Status post L2-L5 laminectomy.    Past Medical History:   Diagnosis Date    Anxiety     Asthma     Obesity      Past Surgical History:   Procedure Laterality Date    BACK SURGERY      LUMBAR LAMINECTOMY N/A 9/27/2023    Procedure: L2-L5 Laminectomy;  Surgeon: Davie Phillips MD;  Location: [facility], [place];  Service: Neurosurgery;  Laterality: N/A;    TONSILLECTOMY       Social History     Tobacco Use    Smoking status: Former     Types: Vaping with nicotine    Smokeless tobacco: Never   Substance Use Topics    Alcohol use: Yes     Comment: occ    Drug use: Never      Current Outpatient Medications   Medication Instructions    albuterol (VENTOLIN HFA) 90 mcg/actuation inhaler 2 puffs, Inhalation, Every 4 hours PRN, Rescue    albuterol-ipratropium (DUO-NEB) 2.5 mg-0.5 mg/3 mL nebulizer solution USE 1 AMPULE IN NEBULIZER EVERY 6 HOURS AS NEEDED FOR WHEEZING    cetirizine (ZYRTEC) 10 mg, Oral, Daily    citalopram (CELEXA) 20 mg, Oral, Daily    diclofenac (VOLTAREN) 75 mg, Oral, Daily    EPINEPHrine (EPIPEN) 0.3 mg, Intramuscular, As needed (PRN)    ferrous sulfate 325 mg, Oral, Daily    ibuprofen (ADVIL,MOTRIN) 800 mg, Oral, Every 6 hours PRN    montelukast (SINGULAIR) 10 mg, Oral,  Nightly    norelgestromin-ethinyl estradiol (ORTHO EVRA) 150-35 mcg/24 hr 1 patch, Transdermal, Weekly    norgestimate-ethinyl estradioL (TRI-LO-SPRINTEC) 0.18/0.215/0.25 mg-25 mcg tablet 1 tablet, Oral, Daily    omeprazole (PRILOSEC) 40 mg, Oral, Every morning    ondansetron (ZOFRAN-ODT) 4 mg, Oral, 2 times daily    oxyCODONE-acetaminophen (PERCOCET) 5-325 mg per tablet 1 tablet, Oral, Every 4 hours PRN    pregabalin (LYRICA) 75 mg, Oral, 2 times daily    promethazine (PHENERGAN) 25 mg, Rectal, Every 6 hours PRN    promethazine (PHENERGAN) 25 mg, Oral, Every 4 hours    SYMBICORT 80-4.5 mcg/actuation HFAA inhale TWO puffs BY MOUTH TWICE DAILY    tiZANidine (ZANAFLEX) 4 MG tablet TAKE 1 TABLET BY MOUTH EVERY DAY AT BEDTIME AS NEEDED        EXAM:  Constitutional  General Appearance:  There is no height or weight on file to calculate BMI., NAD  Psychiatric   Orientation: Oriented to time, oriented to place, oriented to person  Mood and Affect: Active and alert, normal mood, normal affect  Gait and Station   Appearance:  Normal gait, normal tandem gait, able to walk on toes, able to walk on heels  Healed incision  5/5 strength  Sensation intact  2+ pulses

## 2023-11-09 ENCOUNTER — DOCUMENTATION ONLY (OUTPATIENT)
Dept: SPINE | Facility: CLINIC | Age: 18
End: 2023-11-09
Payer: COMMERCIAL

## 2023-11-14 ENCOUNTER — OFFICE VISIT (OUTPATIENT)
Dept: FAMILY MEDICINE | Facility: CLINIC | Age: 18
End: 2023-11-14
Payer: COMMERCIAL

## 2023-11-14 VITALS
SYSTOLIC BLOOD PRESSURE: 117 MMHG | WEIGHT: 291 LBS | TEMPERATURE: 98 F | OXYGEN SATURATION: 99 % | HEIGHT: 69 IN | HEART RATE: 102 BPM | RESPIRATION RATE: 19 BRPM | BODY MASS INDEX: 43.1 KG/M2 | DIASTOLIC BLOOD PRESSURE: 80 MMHG

## 2023-11-14 DIAGNOSIS — J30.89 NON-SEASONAL ALLERGIC RHINITIS, UNSPECIFIED TRIGGER: ICD-10-CM

## 2023-11-14 DIAGNOSIS — J45.909 ASTHMA, UNSPECIFIED ASTHMA SEVERITY, UNSPECIFIED WHETHER COMPLICATED, UNSPECIFIED WHETHER PERSISTENT: Primary | ICD-10-CM

## 2023-11-14 DIAGNOSIS — M62.838 MUSCLE SPASM: ICD-10-CM

## 2023-11-14 PROCEDURE — 1160F PR REVIEW ALL MEDS BY PRESCRIBER/CLIN PHARMACIST DOCUMENTED: ICD-10-PCS | Mod: CPTII,,,

## 2023-11-14 PROCEDURE — 3079F DIAST BP 80-89 MM HG: CPT | Mod: CPTII,,,

## 2023-11-14 PROCEDURE — 99214 OFFICE O/P EST MOD 30 MIN: CPT | Mod: ,,,

## 2023-11-14 PROCEDURE — 3044F PR MOST RECENT HEMOGLOBIN A1C LEVEL <7.0%: ICD-10-PCS | Mod: CPTII,,,

## 2023-11-14 PROCEDURE — 1159F PR MEDICATION LIST DOCUMENTED IN MEDICAL RECORD: ICD-10-PCS | Mod: CPTII,,,

## 2023-11-14 PROCEDURE — 1159F MED LIST DOCD IN RCRD: CPT | Mod: CPTII,,,

## 2023-11-14 PROCEDURE — 3074F SYST BP LT 130 MM HG: CPT | Mod: CPTII,,,

## 2023-11-14 PROCEDURE — 3044F HG A1C LEVEL LT 7.0%: CPT | Mod: CPTII,,,

## 2023-11-14 PROCEDURE — 3079F PR MOST RECENT DIASTOLIC BLOOD PRESSURE 80-89 MM HG: ICD-10-PCS | Mod: CPTII,,,

## 2023-11-14 PROCEDURE — 3008F PR BODY MASS INDEX (BMI) DOCUMENTED: ICD-10-PCS | Mod: CPTII,,,

## 2023-11-14 PROCEDURE — 3008F BODY MASS INDEX DOCD: CPT | Mod: CPTII,,,

## 2023-11-14 PROCEDURE — 3074F PR MOST RECENT SYSTOLIC BLOOD PRESSURE < 130 MM HG: ICD-10-PCS | Mod: CPTII,,,

## 2023-11-14 PROCEDURE — 1160F RVW MEDS BY RX/DR IN RCRD: CPT | Mod: CPTII,,,

## 2023-11-14 PROCEDURE — 99214 PR OFFICE/OUTPT VISIT, EST, LEVL IV, 30-39 MIN: ICD-10-PCS | Mod: ,,,

## 2023-11-14 RX ORDER — ALBUTEROL SULFATE 90 UG/1
2 AEROSOL, METERED RESPIRATORY (INHALATION) EVERY 4 HOURS PRN
Qty: 36 G | Refills: 0 | Status: SHIPPED | OUTPATIENT
Start: 2023-11-14 | End: 2023-12-14 | Stop reason: SDUPTHER

## 2023-11-14 RX ORDER — BUDESONIDE AND FORMOTEROL FUMARATE DIHYDRATE 80; 4.5 UG/1; UG/1
2 AEROSOL RESPIRATORY (INHALATION) 2 TIMES DAILY
Qty: 10.2 G | Refills: 11 | Status: SHIPPED | OUTPATIENT
Start: 2023-11-14

## 2023-11-14 RX ORDER — MONTELUKAST SODIUM 10 MG/1
10 TABLET ORAL NIGHTLY
Qty: 90 TABLET | Refills: 1 | Status: SHIPPED | OUTPATIENT
Start: 2023-11-14

## 2023-11-14 RX ORDER — CETIRIZINE HYDROCHLORIDE 10 MG/1
10 TABLET ORAL DAILY
Qty: 90 TABLET | Refills: 1 | Status: SHIPPED | OUTPATIENT
Start: 2023-11-14

## 2023-11-14 RX ORDER — TIZANIDINE 4 MG/1
TABLET ORAL
Qty: 60 TABLET | Refills: 0 | Status: SHIPPED | OUTPATIENT
Start: 2023-11-14 | End: 2024-01-31 | Stop reason: SDUPTHER

## 2023-11-14 RX ORDER — IPRATROPIUM BROMIDE AND ALBUTEROL SULFATE 2.5; .5 MG/3ML; MG/3ML
SOLUTION RESPIRATORY (INHALATION)
Qty: 90 ML | Refills: 2 | Status: SHIPPED | OUTPATIENT
Start: 2023-11-14 | End: 2024-03-08 | Stop reason: SDUPTHER

## 2023-11-14 NOTE — PROGRESS NOTES
PINKY DAILY   [id number] [facility] [address]  [place]  [address]      PATIENT NAME: Mallory Rojo  : 2005  DATE: 23  MRN: [id number]      Billing Provider: PINKY ADILY  Level of Service: SD OFFICE/OUTPT VISIT, EST, LEVL IV, 30-39 MIN  Patient PCP Information       Provider PCP Type    Mckenzie Giles NP General            Reason for Visit / Chief Complaint: Health Maintenance (Mallory Rojo 18 year old female presents to clinic for medication refills and routine check up.) and Asthma (Reports being a severe asthmatic and would like to request 2 inhalers at once just in case she loses one. If she loses her inhaler she has to stay at home close to her breathing treatment machine)         History of Present Illness / Problem Focused Workflow     Mallory Rojo presents to the clinic with Health Maintenance (Mallory Rojo 18 year old female presents to clinic for medication refills and routine check up.) and Asthma (Reports being a severe asthmatic and would like to request 2 inhalers at once just in case she loses one. If she loses her inhaler she has to stay at home close to her breathing treatment machine)     19 y/o female presents to clinic for medication refill and routine check up. Pt has hx of asthma, GERD, depression and muscle spasms. She denies any concerns at present. States she had Laminectomy done in September by Dr. Phillips and will start physical therapy in a couple weeks. She will follow up with him for this.     Review of Systems     @Review of Systems   Constitutional:  Negative for chills, fatigue and fever.   HENT:  Negative for nasal congestion, ear pain, sinus pressure/congestion and sore throat.    Respiratory:  Negative for cough, chest tightness, shortness of breath and wheezing.    Cardiovascular:  Negative for chest pain and palpitations.   Gastrointestinal:  Negative for abdominal pain, nausea and vomiting.   Musculoskeletal:  Negative for myalgias.    Neurological:  Negative for dizziness, weakness, light-headedness and headaches.   Psychiatric/Behavioral:  Negative for suicidal ideas.        Medical / Social / Family History     Past Medical History:   Diagnosis Date    Anxiety     Asthma     Obesity        Past Surgical History:   Procedure Laterality Date    BACK SURGERY      LUMBAR LAMINECTOMY N/A 9/27/2023    Procedure: L2-L5 Laminectomy;  Surgeon: Davie Phillips MD;  Location: [facility];  Service: Neurosurgery;  Laterality: N/A;    TONSILLECTOMY         Social History  Ms.  reports that she has quit smoking. Her smoking use included vaping with nicotine. She has been exposed to tobacco smoke. She has never used smokeless tobacco. She reports that she does not drink alcohol and does not use drugs.    Family History  Ms.'s family history includes Hypertension in her mother.    Medications and Allergies     Medications  Outpatient Medications Marked as Taking for the 11/14/23 encounter (Office Visit) with Santy Griffith FNP   Medication Sig Dispense Refill    citalopram (CELEXA) 20 MG tablet Take 1 tablet (20 mg total) by mouth once daily. 30 tablet 5    EPINEPHrine (EPIPEN) 0.3 mg/0.3 mL AtIn Inject 0.3 mLs (0.3 mg total) into the muscle as needed (exposure to allergen). 1 each 0    ferrous sulfate 325 (65 FE) MG EC tablet Take 325 mg by mouth once daily at 6am.      [DISCONTINUED] albuterol (VENTOLIN HFA) 90 mcg/actuation inhaler Inhale 2 puffs into the lungs every 4 (four) hours as needed for Wheezing. Rescue 36 g 0    [DISCONTINUED] albuterol-ipratropium (DUO-NEB) 2.5 mg-0.5 mg/3 mL nebulizer solution USE 1 AMPULE IN NEBULIZER EVERY 6 HOURS AS NEEDED FOR WHEEZING 90 mL 2    [DISCONTINUED] cetirizine (ZYRTEC) 10 MG tablet Take 1 tablet (10 mg total) by mouth once daily. 30 tablet 0    [DISCONTINUED] montelukast (SINGULAIR) 10 mg tablet Take 1 tablet (10 mg total) by mouth every evening. 30 tablet 0    [DISCONTINUED] SYMBICORT 80-4.5 mcg/actuation  HFAA inhale TWO puffs BY MOUTH TWICE DAILY 10.2 g 11    [DISCONTINUED] tiZANidine (ZANAFLEX) 4 MG tablet TAKE 1 TABLET BY MOUTH EVERY DAY AT BEDTIME AS NEEDED         Allergies  Review of patient's allergies indicates:   Allergen Reactions    Aller xt-tree pollen-white oak Other (See Comments)    Cat hair standardized allergenic extract Other (See Comments)    Cobalt Dermatitis    Grass pollen-red top, standard Other (See Comments)    Imidazolidinyl urea Dermatitis    Neomycin Dermatitis    Urea Dermatitis    Weed pollen-dog fennel Other (See Comments)    Cefdinir Rash       Physical Examination     Vitals:    11/14/23 1321   BP: 117/80   Pulse: 102   Resp: 19   Temp: 97.6 °F (36.4 °C)     Physical Exam  Vitals and nursing note reviewed.   Constitutional:       General: She is awake.      Appearance: Normal appearance.   HENT:      Head: Normocephalic.      Right Ear: Tympanic membrane and ear canal normal.      Left Ear: Tympanic membrane and ear canal normal.      Nose: Nose normal.      Mouth/Throat:      Lips: Pink.      Mouth: Mucous membranes are moist.      Pharynx: Oropharynx is clear. Uvula midline.   Eyes:      Conjunctiva/sclera: Conjunctivae normal.      Pupils: Pupils are equal, round, and reactive to light.   Cardiovascular:      Rate and Rhythm: Normal rate and regular rhythm.      Heart sounds: Normal heart sounds, S1 normal and S2 normal.   Pulmonary:      Effort: No respiratory distress.      Breath sounds: Normal breath sounds. No decreased breath sounds, wheezing, rhonchi or rales.   Abdominal:      General: Bowel sounds are normal.      Palpations: Abdomen is soft.      Tenderness: There is no abdominal tenderness.   Musculoskeletal:      Cervical back: Normal range of motion.   Skin:     General: Skin is warm.      Capillary Refill: Capillary refill takes less than 2 seconds.   Neurological:      Mental Status: She is alert and oriented to person, place, and time.   Psychiatric:          Attention and Perception: Attention normal.         Mood and Affect: Mood normal.         Speech: Speech normal.         Behavior: Behavior normal. Behavior is cooperative.         Thought Content: Thought content does not include homicidal or suicidal ideation. Thought content does not include homicidal or suicidal plan.               Lab Results   Component Value Date    WBC 9.03 09/21/2023    HGB 13.9 09/21/2023    HCT 43.6 09/21/2023    MCV 92.6 09/21/2023     09/21/2023        CMP  Sodium   Date Value Ref Range Status   09/21/2023 140 136 - 145 mmol/L Final     Potassium   Date Value Ref Range Status   09/21/2023 4.2 3.5 - 5.1 mmol/L Final     Chloride   Date Value Ref Range Status   09/21/2023 103 98 - 107 mmol/L Final     CO2   Date Value Ref Range Status   09/21/2023 25 21 - 32 mmol/L Final     Glucose   Date Value Ref Range Status   09/21/2023 84 74 - 106 mg/dL Final     BUN   Date Value Ref Range Status   09/21/2023 11 7 - 18 mg/dL Final     Creatinine   Date Value Ref Range Status   09/21/2023 0.78 0.55 - 1.02 mg/dL Final     Calcium   Date Value Ref Range Status   09/21/2023 9.0 8.5 - 10.1 mg/dL Final     Total Protein   Date Value Ref Range Status   09/21/2023 8.1 6.4 - 8.2 g/dL Final     Albumin   Date Value Ref Range Status   09/21/2023 3.9 3.5 - 5.0 g/dL Final     Bilirubin, Total   Date Value Ref Range Status   09/21/2023 0.4 >0.0 - 1.2 mg/dL Final     Alk Phos   Date Value Ref Range Status   09/21/2023 97 52 - 144 U/L Final     AST   Date Value Ref Range Status   09/21/2023 9 (L) 15 - 37 U/L Final     ALT   Date Value Ref Range Status   09/21/2023 10 (L) 13 - 56 U/L Final     Anion Gap   Date Value Ref Range Status   09/21/2023 16 7 - 16 mmol/L Final     eGFR   Date Value Ref Range Status   09/21/2023 112 >=60 mL/min/1.73m2 Final     Procedures   Assessment and Plan (including Health Maintenance)   :    Plan:           Problem List Items Addressed This Visit          ENT    Non-seasonal  allergic rhinitis    Current Assessment & Plan     Currently controlled with current medication         Relevant Medications    cetirizine (ZYRTEC) 10 MG tablet       Pulmonary    Asthma - Primary    Current Assessment & Plan     Chronic history of asthma on we will continue her current Ventolin inhalers as well as her DuoNebs at home nebulized.  She is also to continue taking Zyrtec and Singulair as well as Symbicort twice daily.  Symptoms are currently stable.  Follow-up 3 months          Relevant Medications    albuterol (VENTOLIN HFA) 90 mcg/actuation inhaler    montelukast (SINGULAIR) 10 mg tablet    budesonide-formoterol 80-4.5 mcg (SYMBICORT) 80-4.5 mcg/actuation HFAA    albuterol-ipratropium (DUO-NEB) 2.5 mg-0.5 mg/3 mL nebulizer solution       Orthopedic    Muscle spasm    Current Assessment & Plan     Symptoms currently controlled with Zanaflex. Continue current medication regimen         Relevant Medications    tiZANidine (ZANAFLEX) 4 MG tablet       The patient has no Health Maintenance topics of status Not Due    Future Appointments   Date Time Provider Department Center   11/27/2023  1:15 PM Khoa Kimble, PT [facility] OP RT [facility]   1/29/2024 10:45 AM Davie Phillips MD [facility] SPINE [facility]        Health Maintenance Due   Topic Date Due    Lipid Panel  Never done    TETANUS VACCINE  Never done        Follow up in about 3 months (around 2/14/2024).     Signature:  PINKY DAILY  [address] [place]  [address]    Date of encounter: 11/14/23

## 2023-12-14 DIAGNOSIS — J45.909 ASTHMA, UNSPECIFIED ASTHMA SEVERITY, UNSPECIFIED WHETHER COMPLICATED, UNSPECIFIED WHETHER PERSISTENT: Primary | ICD-10-CM

## 2023-12-17 RX ORDER — ALBUTEROL SULFATE 90 UG/1
2 AEROSOL, METERED RESPIRATORY (INHALATION) EVERY 4 HOURS PRN
Qty: 36 G | Refills: 0 | Status: SHIPPED | OUTPATIENT
Start: 2023-12-17 | End: 2024-01-31 | Stop reason: SDUPTHER

## 2024-01-31 DIAGNOSIS — M62.838 MUSCLE SPASM: ICD-10-CM

## 2024-01-31 DIAGNOSIS — J45.909 ASTHMA, UNSPECIFIED ASTHMA SEVERITY, UNSPECIFIED WHETHER COMPLICATED, UNSPECIFIED WHETHER PERSISTENT: ICD-10-CM

## 2024-01-31 RX ORDER — ALBUTEROL SULFATE 90 UG/1
2 AEROSOL, METERED RESPIRATORY (INHALATION) EVERY 4 HOURS PRN
Qty: 36 G | Refills: 0 | Status: SHIPPED | OUTPATIENT
Start: 2024-01-31 | End: 2024-03-20 | Stop reason: SDUPTHER

## 2024-01-31 RX ORDER — TIZANIDINE 4 MG/1
TABLET ORAL
Qty: 60 TABLET | Refills: 0 | Status: SHIPPED | OUTPATIENT
Start: 2024-01-31

## 2024-02-26 DIAGNOSIS — Z09 FOLLOW-UP EXAMINATION AFTER ORTHOPEDIC SURGERY: Primary | ICD-10-CM

## 2024-03-08 DIAGNOSIS — J45.909 ASTHMA, UNSPECIFIED ASTHMA SEVERITY, UNSPECIFIED WHETHER COMPLICATED, UNSPECIFIED WHETHER PERSISTENT: Primary | ICD-10-CM

## 2024-03-11 RX ORDER — IPRATROPIUM BROMIDE AND ALBUTEROL SULFATE 2.5; .5 MG/3ML; MG/3ML
SOLUTION RESPIRATORY (INHALATION)
Qty: 90 ML | Refills: 2 | Status: SHIPPED | OUTPATIENT
Start: 2024-03-11

## 2024-03-18 ENCOUNTER — TELEPHONE (OUTPATIENT)
Dept: FAMILY MEDICINE | Facility: CLINIC | Age: 19
End: 2024-03-18
Payer: COMMERCIAL

## 2024-03-18 NOTE — TELEPHONE ENCOUNTER
Patient mother called requesting albuterol rescue inhaler refill. Stated patient has been out all weekend. [facility] pharmacy was called and pharmacist stated no refill on file since 1/31/24, one prescription was sent to [facility] and filled ant the other was filled at [facility] pharmacy. Will need 3 month appt.      Appt was scheduled 3/18/2024 patient cancelled and rescheduled 3/19/2024.

## 2024-03-19 ENCOUNTER — TELEPHONE (OUTPATIENT)
Dept: FAMILY MEDICINE | Facility: CLINIC | Age: 19
End: 2024-03-19
Payer: COMMERCIAL

## 2024-03-19 NOTE — TELEPHONE ENCOUNTER
Patient called, she is asking why she will have to follow up in 3 months for asthma meds that she takes daily.  She said she has been on these medications since she was 1 yrs old.  And she is asking if appointments will be every 3 months.

## 2024-03-19 NOTE — TELEPHONE ENCOUNTER
If she is not having any problems with the medication or condition then she does not have to come in today. I can send refill in and she follow up in another 3 months.  We can do 6-12 month follow up after that is she is stable on meds.  Plan to discontinue the villalobos tomorrow around 0600 and do voiding trial. Notified ISRAEL RANDLE [facility] RN. Pt up to chair several times today. Will continue to monitor.

## 2024-03-19 NOTE — TELEPHONE ENCOUNTER
"1300:  Patient called wanting to know about her Albuterol inhaler refill. Patient was notified that this nurse had spoken to her mother yesterday and explained she would need a follow up appointment since she had not been seen since 11/2023 by provider. Patient began to state," this is stupid and she does not understand why she has to come in to be seen since she has been using her albuterol since she was 1 years old." Further that," she has been seen at this clinic for the same amount of time and she has never had to come in that often for her medication." Patient stated once she came to this appointment she would not be returning back to this clinic. Informed patient at her appointment she can discuss frequency of office visit  with provider. She was also given option to change provider to her preference. Patient was notified that we could get her set up with her PCP on file Mckenzie Giles. Patient stated she had never seen them before and didn't know who that was. She was notified that the appointment was necessary to ensure effectiveness of medication and also for provider to ensure no adverse side effects are occurring. Patient stated she just wont be returning to this clinic at all. Patient did hang up the phone in my face before I could offer  any additional recommendations.     "

## 2024-03-20 DIAGNOSIS — J45.909 ASTHMA, UNSPECIFIED ASTHMA SEVERITY, UNSPECIFIED WHETHER COMPLICATED, UNSPECIFIED WHETHER PERSISTENT: ICD-10-CM

## 2024-03-20 RX ORDER — ALBUTEROL SULFATE 90 UG/1
2 AEROSOL, METERED RESPIRATORY (INHALATION) EVERY 4 HOURS PRN
Qty: 36 G | Refills: 0 | Status: SHIPPED | OUTPATIENT
Start: 2024-03-20

## 2024-05-09 ENCOUNTER — HOSPITAL ENCOUNTER (EMERGENCY)
Facility: HOSPITAL | Age: 19
Discharge: HOME | End: 2024-05-09
Payer: COMMERCIAL

## 2024-05-09 ENCOUNTER — TELEPHONE (OUTPATIENT)
Dept: SPINE | Facility: CLINIC | Age: 19
End: 2024-05-09
Payer: COMMERCIAL

## 2024-05-09 VITALS
TEMPERATURE: 99 F | SYSTOLIC BLOOD PRESSURE: 104 MMHG | DIASTOLIC BLOOD PRESSURE: 68 MMHG | OXYGEN SATURATION: 97 % | HEART RATE: 69 BPM | WEIGHT: 293 LBS | HEIGHT: 69 IN | BODY MASS INDEX: 43.4 KG/M2 | RESPIRATION RATE: 18 BRPM

## 2024-05-09 DIAGNOSIS — S32.029A CLOSED FRACTURE OF SECOND LUMBAR VERTEBRA, UNSPECIFIED FRACTURE MORPHOLOGY, INITIAL ENCOUNTER: ICD-10-CM

## 2024-05-09 DIAGNOSIS — S32.009A TRAUMATIC FRACTURE OF LUMBAR VERTEBRA: Primary | ICD-10-CM

## 2024-05-09 DIAGNOSIS — S32.029A CLOSED FRACTURE OF SECOND LUMBAR VERTEBRA, UNSPECIFIED FRACTURE MORPHOLOGY, INITIAL ENCOUNTER: Primary | ICD-10-CM

## 2024-05-09 DIAGNOSIS — M54.16 LUMBAR RADICULOPATHY, CHRONIC: Primary | ICD-10-CM

## 2024-05-09 DIAGNOSIS — M54.50 MIDLINE LOW BACK PAIN, UNSPECIFIED CHRONICITY, UNSPECIFIED WHETHER SCIATICA PRESENT: ICD-10-CM

## 2024-05-09 PROCEDURE — 96375 TX/PRO/DX INJ NEW DRUG ADDON: CPT

## 2024-05-09 PROCEDURE — 63600175 PHARM REV CODE 636 W HCPCS: Performed by: NURSE PRACTITIONER

## 2024-05-09 PROCEDURE — 99284 EMERGENCY DEPT VISIT MOD MDM: CPT | Mod: ,,, | Performed by: NURSE PRACTITIONER

## 2024-05-09 PROCEDURE — 96374 THER/PROPH/DIAG INJ IV PUSH: CPT

## 2024-05-09 PROCEDURE — 99285 EMERGENCY DEPT VISIT HI MDM: CPT | Mod: 25

## 2024-05-09 RX ORDER — HYDROCODONE BITARTRATE AND ACETAMINOPHEN 5; 325 MG/1; MG/1
1 TABLET ORAL EVERY 6 HOURS PRN
Qty: 12 TABLET | Refills: 0 | Status: SHIPPED | OUTPATIENT
Start: 2024-05-09

## 2024-05-09 RX ORDER — ONDANSETRON HYDROCHLORIDE 2 MG/ML
4 INJECTION, SOLUTION INTRAVENOUS
Status: COMPLETED | OUTPATIENT
Start: 2024-05-09 | End: 2024-05-09

## 2024-05-09 RX ORDER — IBUPROFEN 800 MG/1
800 TABLET ORAL EVERY 6 HOURS PRN
Qty: 20 TABLET | Refills: 0 | Status: SHIPPED | OUTPATIENT
Start: 2024-05-09

## 2024-05-09 RX ORDER — MORPHINE SULFATE 4 MG/ML
4 INJECTION, SOLUTION INTRAMUSCULAR; INTRAVENOUS
Status: COMPLETED | OUTPATIENT
Start: 2024-05-09 | End: 2024-05-09

## 2024-05-09 RX ORDER — CYCLOBENZAPRINE HCL 10 MG
10 TABLET ORAL 3 TIMES DAILY PRN
Qty: 15 TABLET | Refills: 0 | Status: SHIPPED | OUTPATIENT
Start: 2024-05-09 | End: 2024-05-14

## 2024-05-09 RX ADMIN — ONDANSETRON 4 MG: 2 INJECTION INTRAMUSCULAR; INTRAVENOUS at 01:05

## 2024-05-09 RX ADMIN — MORPHINE SULFATE 4 MG: 4 INJECTION INTRAVENOUS at 01:05

## 2024-05-09 NOTE — ED PROVIDER NOTES
Encounter Date: 5/9/2024       History     Chief Complaint   Patient presents with    Back Pain     Lower back pain     18 year old female presents to ED with complaint of back pain. Patient states on today while at work she had an episode of coughing and sneezing and felt something pop in her back as she was attempting to stand. Patient reports history of back surgery with last surgery being performed at facility in September. She denies known injury. Denies loss of bowel/bladder, numbness/tingling, weakness. Patient reports no medications taken at home prior. Mother states she had an appointment at 11am but was unable to get there due to pain. Patient reports pain worsens with movement.     The history is provided by the patient and a parent. No  was used.     Review of patient's allergies indicates:   Allergen Reactions    Aller xt-tree pollen-white oak Other (See Comments)    Cat hair standardized allergenic extract Other (See Comments)    Cobalt Dermatitis    Grass pollen-red top, standard Other (See Comments)    Imidazolidinyl urea Dermatitis    Neomycin Dermatitis    Urea Dermatitis    Weed pollen-dog fennel Other (See Comments)    Cefdinir Rash     Past Medical History:   Diagnosis Date    Anxiety     Asthma     Obesity      Past Surgical History:   Procedure Laterality Date    BACK SURGERY      LUMBAR LAMINECTOMY N/A 9/27/2023    Procedure: L2-L5 Laminectomy;  Surgeon: Davie Phillips MD;  Location: [facility];  Service: Neurosurgery;  Laterality: N/A;    TONSILLECTOMY       Family History   Problem Relation Name Age of Onset    Hypertension Mother       Social History     Tobacco Use    Smoking status: Former     Types: Vaping with nicotine     Passive exposure: Past    Smokeless tobacco: Never   Substance Use Topics    Alcohol use: Never    Drug use: Never     Review of Systems   Constitutional:  Negative for chills and fever.   HENT:  Negative for sinus pressure and sinus pain.     Eyes:  Negative for photophobia and visual disturbance.   Respiratory:  Negative for cough and shortness of breath.    Cardiovascular:  Negative for chest pain and palpitations.   Gastrointestinal:  Negative for nausea and vomiting.   Genitourinary:  Negative for difficulty urinating and urgency.   Musculoskeletal:  Positive for arthralgias and back pain.   Skin:  Negative for color change and wound.   Neurological:  Negative for dizziness and weakness.   Hematological:  Negative for adenopathy. Does not bruise/bleed easily.   Psychiatric/Behavioral:  Negative for agitation and confusion.    All other systems reviewed and are negative.      Physical Exam     Initial Vitals [05/09/24 1322]   BP Pulse Resp Temp SpO2   102/67 (!) 54 16 98.7 °F (37.1 °C) 97 %      MAP       --         Physical Exam    Nursing note and vitals reviewed.  Constitutional: She appears well-developed and well-nourished.   HENT:   Head: Normocephalic and atraumatic.   Eyes: EOM are normal.   Neck: Neck supple.   Normal range of motion.  Cardiovascular:  Normal rate.           No murmur heard.  Pulmonary/Chest: She has no wheezes. She has no rhonchi.   Abdominal: Abdomen is soft. She exhibits no distension. There is no abdominal tenderness.   Musculoskeletal:         General: No tenderness or edema.      Cervical back: Normal range of motion and neck supple.     Lymphadenopathy:     She has no cervical adenopathy.   Neurological: She is alert and oriented to person, place, and time. No cranial nerve deficit or sensory deficit.   Skin: Skin is warm and dry. Capillary refill takes less than 2 seconds.   Psychiatric: She has a normal mood and affect. Thought content normal.         Medical Screening Exam   See Full Note    ED Course   Procedures  Labs Reviewed - No data to display       Imaging Results              CT Lumbar Spine Without Contrast (Final result)  Result time 05/09/24 14:24:33      Final result by Morgan Moctezuma DO (05/09/24  14:24:33)                   Impression:      Findings as detailed above.    The CT exam was performed using one or more of the following dose    reduction techniques- Automated exposure control, adjustment of the mA    and/or kV according to patient size, and/or use of iterative    reconstructed technique.    Point of Service: [facility]      Electronically signed by: Morgan Moctezuma  Date:    05/09/2024  Time:    14:24               Narrative:    EXAMINATION:  CT LUMBAR SPINE WITHOUT CONTRAST    CLINICAL HISTORY:  Low back pain, increased fracture risk;    COMPARISON:  CT lumbar spine December 11, 2021    TECHNIQUE:  Multiple axial tomographic images of the lumbar spine were obtained without the use of intravenous contrast. Coronal and sagittal reformatted images provided.    FINDINGS:  Nondisplaced fractures are demonstrated through the interarticular portions of the posterior elements bilaterally at L2 and on the right at L3.  These are new from comparison study.Interval laminectomy L2 through L4.  There is straightening of normal lumbar lordosis which may be positional or secondary to muscle spasm. There is no significant anterolisthesis or retrolisthesis.  Diffuse disc bulging suggested at L2-3 with at least mild-to-moderate spinal canal narrowing.  Lumbar vertebral body heights appear maintained.                                       Medications   morphine injection 4 mg (4 mg Intravenous Given 5/9/24 1355)   ondansetron injection 4 mg (4 mg Intravenous Given 5/9/24 1355)     Medical Decision Making  18 year old female presents to ED with complaint of back pain. Patient states on today while at work she had an episode of coughing and sneezing and felt something pop in her back as she was attempting to stand. Patient reports history of back surgery with last surgery being performed at facility in September. She denies known injury. Denies loss of bowel/bladder, numbness/tingling, weakness. Patient  reports no medications taken at home prior. Mother states she had an appointment at 11am but was unable to get there due to pain. Patient reports pain worsens with movement.     Diagnostics obtained. Case discussed with Ortho Spine office; Dr. Phillips reviewed CT. TSLO brace applied. Prescriptions provided. Discussed with patient/mother and verbalized understanding.     Amount and/or Complexity of Data Reviewed  Radiology: ordered.     Details: Nondisplaced fractures are demonstrated through the interarticular portions of the posterior elements bilaterally at L2 and on the right at L3.  These are new from comparison study.Interval laminectomy L2 through L4.    Risk  Prescription drug management.                                      Clinical Impression:   Final diagnoses:  [S32.029A] Closed fracture of second lumbar vertebra, unspecified fracture morphology, initial encounter (Primary)  [M54.50] Midline low back pain, unspecified chronicity, unspecified whether sciatica present        ED Disposition Condition    Discharge Stable          ED Prescriptions       Medication Sig Dispense Start Date End Date Auth. Provider    ibuprofen (ADVIL,MOTRIN) 800 MG tablet Take 1 tablet (800 mg total) by mouth every 6 (six) hours as needed for Pain. 20 tablet 5/9/2024 -- Verna Marie FNP    cyclobenzaprine (FLEXERIL) 10 MG tablet Take 1 tablet (10 mg total) by mouth 3 (three) times daily as needed for Muscle spasms. 15 tablet 5/9/2024 5/14/2024 Verna Marie FNP    HYDROcodone-acetaminophen (NORCO) 5-325 mg per tablet Take 1 tablet by mouth every 6 (six) hours as needed for Pain. 12 tablet 5/9/2024 -- Verna Marie FNP          Follow-up Information    None          Verna Marie, PINKY  05/09/24 1546

## 2024-05-09 NOTE — TELEPHONE ENCOUNTER
Patients mother called this AM and said that she is unable to get Mallory in the car to bring her to appointment to see Dr. Phillips today due to severe pain. She denies any falls. Reports that she coughed hard and felt a pop in her back. She reports that Mallory can barely walk due to pain and she is going to have to call and ambulance and bring her to ER. She was brought to ER here they performed a Ct scan that revealed L2 and L3 fractures.   Dr. Phillips is ordering MRI of Lumbar.

## 2024-05-09 NOTE — ED TRIAGE NOTES
Patient arrives to ED via EMS with chief complaint of lower back pain. Patient has a history of chronic lower back pain and has had two spine surgeries in the past. Patient states severe pain in lower back while at work today. Patient denies any numbness or tingling, loss of bowel, or bladder.

## 2024-05-09 NOTE — Clinical Note
"Mallory De Jesusalex Rojo was seen and treated in our emergency department on 5/9/2024.  She may return to work on 05/13/2024.       If you have any questions or concerns, please don't hesitate to call.      Verna Marie, FNP"

## 2024-05-14 ENCOUNTER — TELEPHONE (OUTPATIENT)
Dept: SPINE | Facility: CLINIC | Age: 19
End: 2024-05-14
Payer: COMMERCIAL

## 2024-05-14 RX ORDER — DIAZEPAM 5 MG/1
5 TABLET ORAL SEE ADMIN INSTRUCTIONS
Qty: 1 TABLET | Refills: 0 | Status: SHIPPED | OUTPATIENT
Start: 2024-05-14 | End: 2024-06-13

## 2024-05-14 NOTE — TELEPHONE ENCOUNTER
Patients mother called and wants to know if we will send in valium for Mallory to take before the MRI. Instead of Xanax, states that xanax does not work well for her.

## 2024-05-17 RX ORDER — DIAZEPAM 5 MG/1
5 TABLET ORAL EVERY 6 HOURS PRN
Qty: 1 TABLET | Refills: 0 | Status: SHIPPED | OUTPATIENT
Start: 2024-05-17 | End: 2024-06-16

## 2024-05-17 NOTE — TELEPHONE ENCOUNTER
----- Message from Di Phillips sent at 5/17/2024  9:54 AM CDT -----  Pt's mother calling stating that are at facility for pt to have mri and she took the vallum when she left the house and it has worn off - wanting to see if another one can be called in - states they don't have another mri until 11 so I called in before then they can do her mri still - Call back # [phone] - [facility]

## 2024-05-20 ENCOUNTER — TELEPHONE (OUTPATIENT)
Dept: SPINE | Facility: CLINIC | Age: 19
End: 2024-05-20
Payer: COMMERCIAL

## 2024-07-27 ENCOUNTER — HOSPITAL ENCOUNTER (EMERGENCY)
Facility: HOSPITAL | Age: 19
Discharge: HOME | End: 2024-07-27
Payer: COMMERCIAL

## 2024-07-27 VITALS
TEMPERATURE: 98 F | HEART RATE: 70 BPM | RESPIRATION RATE: 16 BRPM | OXYGEN SATURATION: 97 % | DIASTOLIC BLOOD PRESSURE: 67 MMHG | SYSTOLIC BLOOD PRESSURE: 112 MMHG | WEIGHT: 293 LBS | HEIGHT: 69 IN | BODY MASS INDEX: 43.4 KG/M2

## 2024-07-27 DIAGNOSIS — J45.909 ASTHMA, UNSPECIFIED ASTHMA SEVERITY, UNSPECIFIED WHETHER COMPLICATED, UNSPECIFIED WHETHER PERSISTENT: Primary | ICD-10-CM

## 2024-07-27 PROCEDURE — 96372 THER/PROPH/DIAG INJ SC/IM: CPT | Performed by: NURSE PRACTITIONER

## 2024-07-27 PROCEDURE — 25000242 PHARM REV CODE 250 ALT 637 W/ HCPCS: Performed by: NURSE PRACTITIONER

## 2024-07-27 PROCEDURE — 94640 AIRWAY INHALATION TREATMENT: CPT

## 2024-07-27 PROCEDURE — 94761 N-INVAS EAR/PLS OXIMETRY MLT: CPT

## 2024-07-27 PROCEDURE — 63600175 PHARM REV CODE 636 W HCPCS: Performed by: NURSE PRACTITIONER

## 2024-07-27 PROCEDURE — 99284 EMERGENCY DEPT VISIT MOD MDM: CPT | Mod: ,,, | Performed by: NURSE PRACTITIONER

## 2024-07-27 PROCEDURE — 99284 EMERGENCY DEPT VISIT MOD MDM: CPT | Mod: 25

## 2024-07-27 RX ORDER — ESCITALOPRAM OXALATE 20 MG/1
20 TABLET ORAL
COMMUNITY
Start: 2024-07-19

## 2024-07-27 RX ORDER — METHYLPREDNISOLONE 4 MG/1
TABLET ORAL
Qty: 21 EACH | Refills: 0 | Status: SHIPPED | OUTPATIENT
Start: 2024-07-27 | End: 2024-08-17

## 2024-07-27 RX ORDER — DEXAMETHASONE SODIUM PHOSPHATE 4 MG/ML
8 INJECTION, SOLUTION INTRA-ARTICULAR; INTRALESIONAL; INTRAMUSCULAR; INTRAVENOUS; SOFT TISSUE
Status: COMPLETED | OUTPATIENT
Start: 2024-07-27 | End: 2024-07-27

## 2024-07-27 RX ORDER — IPRATROPIUM BROMIDE AND ALBUTEROL SULFATE 2.5; .5 MG/3ML; MG/3ML
3 SOLUTION RESPIRATORY (INHALATION)
Status: COMPLETED | OUTPATIENT
Start: 2024-07-27 | End: 2024-07-27

## 2024-07-27 RX ORDER — FLUTICASONE PROPIONATE AND SALMETEROL XINAFOATE 45; 21 UG/1; UG/1
2 AEROSOL, METERED RESPIRATORY (INHALATION) 2 TIMES DAILY
COMMUNITY
Start: 2024-07-03

## 2024-07-27 RX ADMIN — IPRATROPIUM BROMIDE AND ALBUTEROL SULFATE 3 ML: 2.5; .5 SOLUTION RESPIRATORY (INHALATION) at 09:07

## 2024-07-27 RX ADMIN — DEXAMETHASONE SODIUM PHOSPHATE 8 MG: 4 INJECTION, SOLUTION INTRA-ARTICULAR; INTRALESIONAL; INTRAMUSCULAR; INTRAVENOUS; SOFT TISSUE at 09:07

## 2024-07-28 NOTE — DISCHARGE INSTRUCTIONS
Follow-up with your primary care provider in 2-3 days.  Return to the emergency department for any worsening of condition or any concerns.  Get Medrol Dosepak filled and take as directed.

## 2024-07-28 NOTE — ED PROVIDER NOTES
Encounter Date: 7/27/2024       History     Chief Complaint   Patient presents with    Shortness of Breath     Pt came to the ED SOB AND HAS A HX OF ASTHMA, SHE HAS TAKEN DUONEB AT HOME X1 AND STATES SHE FEELS SOB.      Patient presents today with complaint of asthma exacerbation that began around 8:00 p.m..  She reports using her rescue inhaler and a nebulizer treatment and then come into the emergency department.  Her symptoms had already greatly improved by the time she got to the emergency department.  She states she still has a little bit of wheezing and shortness of breath.  She denies fever nausea or vomiting.        Review of patient's allergies indicates:   Allergen Reactions    Aller xt-tree pollen-white oak Other (See Comments)    Cat hair standardized allergenic extract Other (See Comments)    Cobalt Dermatitis    Grass pollen-red top, standard Other (See Comments)    Imidazolidinyl urea Dermatitis    Neomycin Dermatitis    Urea Dermatitis    Weed pollen-dog fennel Other (See Comments)    Cefdinir Rash     Past Medical History:   Diagnosis Date    Anxiety     Asthma     Obesity      Past Surgical History:   Procedure Laterality Date    BACK SURGERY      LUMBAR LAMINECTOMY N/A 9/27/2023    Procedure: L2-L5 Laminectomy;  Surgeon: Davie Phillips MD;  Location: [facility];  Service: Neurosurgery;  Laterality: N/A;    TONSILLECTOMY       Family History   Problem Relation Name Age of Onset    Hypertension Mother       Social History     Tobacco Use    Smoking status: Former     Types: Vaping with nicotine     Passive exposure: Past    Smokeless tobacco: Never   Substance Use Topics    Alcohol use: Never    Drug use: Never     Review of Systems   Constitutional: Negative.    Respiratory:  Positive for shortness of breath and wheezing. Negative for cough.    Cardiovascular: Negative.    All other systems reviewed and are negative.      Physical Exam     Initial Vitals [07/27/24 2120]   BP Pulse Resp Temp  SpO2   98/61 92 (!) 24 98.1 °F (36.7 °C) 95 %      MAP       --         Physical Exam    Nursing note and vitals reviewed.  Constitutional: She appears well-developed and well-nourished.   Neck:   Normal range of motion.  Cardiovascular:  Normal rate, regular rhythm and normal heart sounds.           No murmur heard.  Pulmonary/Chest: Breath sounds normal. No respiratory distress. She has. Wheezes: mild expiratory wheezing.  Musculoskeletal:         General: Normal range of motion.      Cervical back: Normal range of motion.     Neurological: She is alert and oriented to person, place, and time. GCS score is 15. GCS eye subscore is 4. GCS verbal subscore is 5. GCS motor subscore is 6.   Skin: Skin is warm and dry.   Psychiatric: She has a normal mood and affect.         Medical Screening Exam   See Full Note    ED Course   Procedures  Labs Reviewed - No data to display       Imaging Results    None          Medications   albuterol-ipratropium 2.5 mg-0.5 mg/3 mL nebulizer solution 3 mL (has no administration in time range)   dexAMETHasone injection 8 mg (has no administration in time range)     Medical Decision Making  Risk  Prescription drug management.               ED Course as of 07/27/24 2132   Sat Jul 27, 2024 2127 Patient's symptoms are improving from her nebulizer treatment before she came.  She states she continues to have a little bit of a wheeze.  She reports or shortness for breath sounds much better.  Has no complaints of fever nausea or vomiting.  We will treat her with a DuoNeb here along with 6 mg of IM Decadron and have her follow up with her primary care provider in 2-3 days [BC]      ED Course User Index  [BC] Alirio Vargas, KASSIE                           Clinical Impression:   Final diagnoses:  [J45.909] Asthma, unspecified asthma severity, unspecified whether complicated, unspecified whether persistent (Primary)        ED Disposition Condition    Discharge Stable          ED Prescriptions        Medication Sig Dispense Start Date End Date Auth. Provider    methylPREDNISolone (MEDROL DOSEPACK) 4 mg tablet use as directed 21 each 7/27/2024 8/17/2024 Alirio Vargas, KASSIE          Follow-up Information       Follow up With Specialties Details Why Contact Info    Santy Griffith, [facility] In 2 days As needed, If symptoms worsen [address]  [place] [address]  [phone]               Alirio Vargas, NP  07/27/24 2949

## 2024-10-10 ENCOUNTER — OFFICE VISIT (OUTPATIENT)
Dept: FAMILY MEDICINE | Facility: CLINIC | Age: 19
End: 2024-10-10
Payer: COMMERCIAL

## 2024-10-10 VITALS
TEMPERATURE: 98 F | DIASTOLIC BLOOD PRESSURE: 70 MMHG | BODY MASS INDEX: 47.67 KG/M2 | RESPIRATION RATE: 20 BRPM | OXYGEN SATURATION: 96 % | SYSTOLIC BLOOD PRESSURE: 105 MMHG | HEART RATE: 80 BPM | WEIGHT: 293 LBS

## 2024-10-10 DIAGNOSIS — R09.81 NASAL CONGESTION: Primary | ICD-10-CM

## 2024-10-10 DIAGNOSIS — R05.1 ACUTE COUGH: ICD-10-CM

## 2024-10-10 DIAGNOSIS — J02.9 SORE THROAT: ICD-10-CM

## 2024-10-10 DIAGNOSIS — J01.90 ACUTE BACTERIAL SINUSITIS: ICD-10-CM

## 2024-10-10 DIAGNOSIS — B96.89 ACUTE BACTERIAL SINUSITIS: ICD-10-CM

## 2024-10-10 LAB
CTP QC/QA: YES
CTP QC/QA: YES
MOLECULAR STREP A: NEGATIVE
POC MOLECULAR INFLUENZA A AGN: NEGATIVE
POC MOLECULAR INFLUENZA B AGN: NEGATIVE

## 2024-10-10 RX ORDER — IPRATROPIUM BROMIDE 21 UG/1
2 SPRAY, METERED NASAL 3 TIMES DAILY
Qty: 30 ML | Refills: 0 | Status: SHIPPED | OUTPATIENT
Start: 2024-10-10

## 2024-10-10 RX ORDER — AZITHROMYCIN 250 MG/1
TABLET, FILM COATED ORAL
Qty: 6 TABLET | Refills: 0 | Status: SHIPPED | OUTPATIENT
Start: 2024-10-10 | End: 2024-10-15

## 2024-10-10 RX ORDER — CEFTRIAXONE 1 G/1
1 INJECTION, POWDER, FOR SOLUTION INTRAMUSCULAR; INTRAVENOUS
Status: COMPLETED | OUTPATIENT
Start: 2024-10-10 | End: 2024-10-10

## 2024-10-10 RX ORDER — BENZONATATE 100 MG/1
200 CAPSULE ORAL 3 TIMES DAILY PRN
Qty: 60 CAPSULE | Refills: 1 | Status: SHIPPED | OUTPATIENT
Start: 2024-10-10 | End: 2024-10-30

## 2024-10-10 RX ORDER — METHYLPREDNISOLONE ACETATE 40 MG/ML
40 INJECTION, SUSPENSION INTRA-ARTICULAR; INTRALESIONAL; INTRAMUSCULAR; SOFT TISSUE ONCE
Status: COMPLETED | OUTPATIENT
Start: 2024-10-10 | End: 2024-10-10

## 2024-10-10 RX ADMIN — METHYLPREDNISOLONE ACETATE 40 MG: 40 INJECTION, SUSPENSION INTRA-ARTICULAR; INTRALESIONAL; INTRAMUSCULAR; SOFT TISSUE at 10:10

## 2024-10-10 RX ADMIN — CEFTRIAXONE 1 G: 1 INJECTION, POWDER, FOR SOLUTION INTRAMUSCULAR; INTRAVENOUS at 10:10

## 2024-10-10 NOTE — PROGRESS NOTES
10/10/24 0839   Depression Patient Health Questionnaire (PHQ-2)   Over the last two weeks how often have you been bothered by little interest or pleasure in doing things 2   Over the last two weeks how often have you been bothered by feeling down, depressed or hopeless 2   PHQ-2 Total Score 4   Depression Patient Health Questionnaire (PHQ-9)   Over the last two weeks how often have you been bothered by trouble falling or staying asleep, or sleeping too much 3   Over the last two weeks how often have you been bothered by feeling tired or having little energy 1   Over the last two weeks how often have you been bothered by a poor appetite or overeating 0   Over the last two weeks how often have you been bothered by feeling bad about yourself - or that you are a failure or have let yourself or your family down 0   Over the last two weeks how often have you been bothered by trouble concentrating on things, such as reading the newspaper or watching television 2   Over the last two weeks how often have you been bothered by moving or speaking so slowly that other people could have noticed. Or the opposite - being so fidgety or restless that you have been moving around a lot more than usual. 0   Over the last two weeks how often have you been bothered by thoughts that you would be better off dead, or of hurting yourself 0   If you checked off any problems, how difficult have these problems made it for you to do your work, take care of things at home or get along with other people? Very difficult   PHQ-9 Score 10   PHQ-9 Interpretation Moderate

## 2024-10-10 NOTE — PROGRESS NOTES
Timoteo Calderon DO   [facility]  [address]  [place]  [address]      PATIENT NAME: Mallory Rojo  : 2005  DATE: 10/10/24  MRN: [id number]      Billing Provider: Timoteo Calderon DO  Level of Service:   Patient PCP Information       Provider PCP Type    PINKY Gomez [facility]            Reason for Visit / Chief Complaint: Sore Throat (Mallory Rojo 19 y/o here for sick vist. Symptoms started yesterday. Sore throat, nasal congestion, ear fullness and occasional cough. She has only used some nasal spray. Declines covid swab), Nasal Congestion, and Cough         History of Present Illness / Problem Focused Workflow     Sore Throat   Associated symptoms include coughing. Pertinent negatives include no abdominal pain, diarrhea, shortness of breath or vomiting.   Cough  Associated symptoms include a sore throat. Pertinent negatives include no chest pain, chills, fever or shortness of breath.       Review of Systems     @Review of Systems   Constitutional:  Negative for chills, fatigue and fever.   HENT:  Positive for sore throat.    Eyes:  Negative for visual disturbance.   Respiratory:  Positive for cough. Negative for shortness of breath.    Cardiovascular:  Negative for chest pain, palpitations and leg swelling.   Gastrointestinal:  Negative for abdominal pain, diarrhea, rectal pain and vomiting.   Musculoskeletal:  Negative for arthralgias.   Neurological:  Negative for dizziness and light-headedness.       Medical / Social / Family History     Past Medical History:   Diagnosis Date    Anxiety     Asthma     Obesity        Past Surgical History:   Procedure Laterality Date    BACK SURGERY      LUMBAR LAMINECTOMY N/A 2023    Procedure: L2-L5 Laminectomy;  Surgeon: Davie Phillips MD;  Location: [facility];  Service: Neurosurgery;  Laterality: N/A;    TONSILLECTOMY         Medications and Allergies     Medications  Outpatient Medications Marked as Taking for the 10/10/24  encounter (Office Visit) with Timoteo Calderon DO   Medication Sig Dispense Refill    ADVAIR HFA 45-21 mcg/actuation HFAA inhaler Inhale 2 puffs into the lungs 2 (two) times daily.      albuterol (VENTOLIN HFA) 90 mcg/actuation inhaler Inhale 2 puffs into the lungs every 4 (four) hours as needed for Wheezing. Rescue 36 g 0    albuterol-ipratropium (DUO-NEB) 2.5 mg-0.5 mg/3 mL nebulizer solution USE 1 AMPULE IN NEBULIZER EVERY 6 HOURS AS NEEDED FOR WHEEZING 90 mL 2    cetirizine (ZYRTEC) 10 MG tablet Take 1 tablet (10 mg total) by mouth once daily. 90 tablet 1    EPINEPHrine (EPIPEN) 0.3 mg/0.3 mL AtIn Inject 0.3 mLs (0.3 mg total) into the muscle as needed (exposure to allergen). 1 each 0    EScitalopram oxalate (LEXAPRO) 20 MG tablet Take 20 mg by mouth.      HYDROcodone-acetaminophen (NORCO) 5-325 mg per tablet Take 1 tablet by mouth every 6 (six) hours as needed for Pain. 12 tablet 0    montelukast (SINGULAIR) 10 mg tablet Take 1 tablet (10 mg total) by mouth every evening. 90 tablet 1     Current Facility-Administered Medications for the 10/10/24 encounter (Office Visit) with Timoteo Calderon DO   Medication Dose Route Frequency Provider Last Rate Last Admin    [COMPLETED] cefTRIAXone injection 1 g  1 g Intramuscular 1 time in Clinic/HOD Timoteo Calderon DO   1 g at 10/10/24 1007    [COMPLETED] methylPREDNISolone acetate injection 40 mg  40 mg Intramuscular Once Timoteo Calderon DO   40 mg at 10/10/24 1015       Allergies  Review of patient's allergies indicates:   Allergen Reactions    Cherry flavor     Aller xt-tree pollen-white oak Other (See Comments)    Cat hair standardized allergenic extract Other (See Comments)    Cobalt Dermatitis    Grass pollen-red top, standard Other (See Comments)    Imidazolidinyl urea Dermatitis    Neomycin Dermatitis    Urea Dermatitis    Weed pollen-dog fennel Other (See Comments)    Cefdinir Rash       Physical Examination     Vitals:    10/10/24 0849   BP: 105/70   Pulse:  80   Resp: 20   Temp: 98.1 °F (36.7 °C)     Physical Exam  Vitals reviewed.   Constitutional:       General: She is not in acute distress.     Appearance: She is not ill-appearing, toxic-appearing or diaphoretic.   HENT:      Head: Normocephalic and atraumatic.      Right Ear: External ear normal.      Left Ear: External ear normal.      Nose: Congestion present.      Mouth/Throat:      Mouth: Mucous membranes are moist.   Eyes:      General: No scleral icterus.     Pupils: Pupils are equal, round, and reactive to light.   Cardiovascular:      Rate and Rhythm: Normal rate and regular rhythm.      Heart sounds: Normal heart sounds. No murmur heard.     No friction rub. No gallop.   Pulmonary:      Effort: Pulmonary effort is normal. No respiratory distress.      Breath sounds: Normal breath sounds. No wheezing, rhonchi or rales.   Abdominal:      General: Bowel sounds are normal.      Palpations: Abdomen is soft.      Tenderness: There is no abdominal tenderness. There is no guarding or rebound.   Musculoskeletal:         General: No swelling.      Right lower leg: No edema.      Left lower leg: No edema.   Skin:     General: Skin is warm and dry.      Coloration: Skin is not jaundiced.      Findings: No erythema or rash.   Neurological:      Mental Status: She is alert and oriented to person, place, and time.               Lab Results   Component Value Date    WBC 9.03 09/21/2023    HGB 13.9 09/21/2023    HCT 43.6 09/21/2023    MCV 92.6 09/21/2023     09/21/2023        CMP  Sodium   Date Value Ref Range Status   09/21/2023 140 136 - 145 mmol/L Final     Potassium   Date Value Ref Range Status   09/21/2023 4.2 3.5 - 5.1 mmol/L Final     Chloride   Date Value Ref Range Status   09/21/2023 103 98 - 107 mmol/L Final     CO2   Date Value Ref Range Status   09/21/2023 25 21 - 32 mmol/L Final     Glucose   Date Value Ref Range Status   09/21/2023 84 74 - 106 mg/dL Final     BUN   Date Value Ref Range Status    09/21/2023 11 7 - 18 mg/dL Final     Creatinine   Date Value Ref Range Status   09/21/2023 0.78 0.55 - 1.02 mg/dL Final     Calcium   Date Value Ref Range Status   09/21/2023 9.0 8.5 - 10.1 mg/dL Final     Total Protein   Date Value Ref Range Status   09/21/2023 8.1 6.4 - 8.2 g/dL Final     Albumin   Date Value Ref Range Status   09/21/2023 3.9 3.5 - 5.0 g/dL Final     Bilirubin, Total   Date Value Ref Range Status   09/21/2023 0.4 >0.0 - 1.2 mg/dL Final     Alk Phos   Date Value Ref Range Status   09/21/2023 97 52 - 144 U/L Final     AST   Date Value Ref Range Status   09/21/2023 9 (L) 15 - 37 U/L Final     ALT   Date Value Ref Range Status   09/21/2023 10 (L) 13 - 56 U/L Final     Anion Gap   Date Value Ref Range Status   09/21/2023 16 7 - 16 mmol/L Final     eGFR   Date Value Ref Range Status   09/21/2023 112 >=60 mL/min/1.73m2 Final     Procedures   Assessment and Plan (including Health Maintenance)   :    Plan:     Problem List Items Addressed This Visit    None  Visit Diagnoses       Nasal congestion    -  Primary    Relevant Medications    methylPREDNISolone acetate injection 40 mg (Completed)    Other Relevant Orders    POCT Influenza A/B Molecular (Completed)    POCT Strep A, Molecular (Completed)    Sore throat        Relevant Orders    POCT Strep A, Molecular (Completed)    Acute cough        Relevant Medications    benzonatate (TESSALON) 100 MG capsule    dextromethorphan-guaiFENesin  mg (MUCINEX DM)  mg per 12 hr tablet    Other Relevant Orders    POCT Influenza A/B Molecular (Completed)    POCT Strep A, Molecular (Completed)    Acute bacterial sinusitis        Relevant Medications    azithromycin (Z-SHAWNA) 250 MG tablet        Patient given antibiotics and medications for symptomatic relief.  Patient counseled at length to call, return to clinic or present to the ED should symptoms persist or worsen.  Follow up as needed.  Patient's signals understanding and agreement with the plan of  care.    Health Maintenance Topics with due status: Not Due       Topic Last Completion Date    RSV Vaccine (Age 60+ and Pregnant patients) Not Due       No future appointments.     Health Maintenance Due   Topic Date Due    Lipid Panel  Never done    Chlamydia Screening  Never done    TETANUS VACCINE  Never done    Influenza Vaccine (1) Never done    COVID-19 Vaccine (3 - 2024-25 season) 09/01/2024          Signature:  Timoteo Calderon [address] [place]  [address]    Date of encounter: 10/10/24

## 2025-02-25 NOTE — TELEPHONE ENCOUNTER
Called and discussed Dr. Graves review of Mri that was done at [facility]. Per Dr. Phillips she has a disc herniation at L3- L4 Per Dr. Phillips there is no significant nerve pressure and he recommends conservative treatment with possible injections with pain treatment clinic. Patient is established with [facility]. We will forward notes to them. She verbalizes understanding.      ----- Message from Sharron Velazquez sent at 5/20/2024  3:05 PM CDT -----  Regarding: RESULTS  PATIENT'S MOTHER CALLING TO GET MRI RESULTS, CALL BACK NUMBER [phone]   Patient presented with acute kidney injury: In the setting of acute illness, sepsis, and diarrhea, plus ACE inhibitor/NSAID  Baseline creatinine 1.0  Patient presented with a creatinine of 7.0, and metabolic acidosis  Was given IV fluids, Tinajero catheter placed, and followed by nephrology  Creatinine markedly improved, Tinajero discontinued

## 2025-03-03 ENCOUNTER — TELEPHONE (OUTPATIENT)
Dept: PULMONOLOGY | Facility: CLINIC | Age: 20
End: 2025-03-03
Payer: COMMERCIAL

## 2025-03-03 ENCOUNTER — PATIENT MESSAGE (OUTPATIENT)
Dept: PULMONOLOGY | Facility: CLINIC | Age: 20
End: 2025-03-03
Payer: COMMERCIAL

## 2025-03-03 NOTE — TELEPHONE ENCOUNTER
----- Message from Lakisha sent at 3/3/2025  8:05 AM CST -----  Regarding: Appt Access  Contact: Faith ( Mother)  SCHEDULING/REQUESTAppt Type:  NPDate/Time Preference:  n/aTreating Provider:  Jackelyn Name:  Faith ( Mother)Contact Preference:  [phone] (home)  Comments/Notes:  NP's mother is requesting an appt for pt who has Chronic Asthma.  Please call.

## 2025-03-12 RX ORDER — BENZONATATE 100 MG/1
200 CAPSULE ORAL
COMMUNITY
Start: 2024-11-20 | End: 2025-03-13

## 2025-03-13 ENCOUNTER — OFFICE VISIT (OUTPATIENT)
Dept: PULMONOLOGY | Facility: CLINIC | Age: 20
End: 2025-03-13
Payer: COMMERCIAL

## 2025-03-13 VITALS
OXYGEN SATURATION: 98 % | WEIGHT: 293 LBS | HEART RATE: 60 BPM | RESPIRATION RATE: 18 BRPM | BODY MASS INDEX: 43.4 KG/M2 | DIASTOLIC BLOOD PRESSURE: 66 MMHG | SYSTOLIC BLOOD PRESSURE: 112 MMHG | HEIGHT: 69 IN

## 2025-03-13 DIAGNOSIS — R06.02 SOB (SHORTNESS OF BREATH): Primary | ICD-10-CM

## 2025-03-13 DIAGNOSIS — J45.20 MILD INTERMITTENT ASTHMA WITHOUT COMPLICATION: ICD-10-CM

## 2025-03-13 PROCEDURE — 3008F BODY MASS INDEX DOCD: CPT | Mod: CPTII,,, | Performed by: INTERNAL MEDICINE

## 2025-03-13 PROCEDURE — 99215 OFFICE O/P EST HI 40 MIN: CPT | Mod: PBBFAC | Performed by: INTERNAL MEDICINE

## 2025-03-13 PROCEDURE — 3078F DIAST BP <80 MM HG: CPT | Mod: CPTII,,, | Performed by: INTERNAL MEDICINE

## 2025-03-13 PROCEDURE — 1159F MED LIST DOCD IN RCRD: CPT | Mod: CPTII,,, | Performed by: INTERNAL MEDICINE

## 2025-03-13 PROCEDURE — 3074F SYST BP LT 130 MM HG: CPT | Mod: CPTII,,, | Performed by: INTERNAL MEDICINE

## 2025-03-13 PROCEDURE — 99999 PR PBB SHADOW E&M-EST. PATIENT-LVL V: CPT | Mod: PBBFAC,,, | Performed by: INTERNAL MEDICINE

## 2025-03-13 PROCEDURE — 99203 OFFICE O/P NEW LOW 30 MIN: CPT | Mod: S$PBB,,, | Performed by: INTERNAL MEDICINE

## 2025-03-13 RX ORDER — GABAPENTIN 300 MG/1
CAPSULE ORAL
COMMUNITY
Start: 2025-03-04

## 2025-03-13 RX ORDER — FLUTICASONE PROPIONATE AND SALMETEROL 250; 50 UG/1; UG/1
1 POWDER RESPIRATORY (INHALATION) 2 TIMES DAILY
Qty: 180 EACH | Refills: 5 | Status: SHIPPED | OUTPATIENT
Start: 2025-03-13 | End: 2025-06-11

## 2025-03-13 NOTE — PROGRESS NOTES
Subjective:       Patient ID: Mallory Rojo is a 19 y.o. female.    Chief Complaint: Asthma (Since age 1. )    Asthma  She complains of shortness of breath. The current episode started more than 1 month ago. Asthma causes daytime symptoms frequently. Asthma causes nighttime symptoms frequently. The problem has been gradually worsening. Pertinent negatives include no chest pain, ear pain or headaches. Her symptoms are aggravated by exposure to fumes and exercise. Her past medical history is significant for asthma.     Past Medical History:   Diagnosis Date    Anxiety     Asthma     Obesity      Past Surgical History:   Procedure Laterality Date    BACK SURGERY      LUMBAR LAMINECTOMY N/A 9/27/2023    Procedure: L2-L5 Laminectomy;  Surgeon: Davie Phillips MD;  Location: [facility];  Service: Neurosurgery;  Laterality: N/A;    TONSILLECTOMY       Family History   Problem Relation Name Age of Onset    Hypertension Mother       Review of patient's allergies indicates:   Allergen Reactions    Cherry flavor     Aller xt-tree pollen-white oak Other (See Comments)    Cat hair standardized allergenic extract Other (See Comments)    Cobalt Dermatitis    Grass pollen-red top, standard Other (See Comments)    Imidazolidinyl urea Dermatitis    Neomycin Dermatitis    Urea Dermatitis    Weed pollen-dog fennel Other (See Comments)    Cefdinir Rash      Social History[1]   Review of Systems   Constitutional:  Negative for chills, activity change and night sweats.   HENT:  Negative for congestion and ear pain.    Eyes:  Negative for redness and itching.   Respiratory:  Positive for shortness of breath.    Cardiovascular:  Negative for chest pain and palpitations.   Musculoskeletal:  Negative for arthralgias and back pain.   Skin:  Negative for rash.   Gastrointestinal:  Negative for abdominal pain and abdominal distention.   Neurological:  Negative for dizziness and headaches.   Hematological:  Negative for adenopathy. Does not  "bruise/bleed easily.   Psychiatric/Behavioral:  Negative for confusion. The patient is not nervous/anxious.        Objective:      Physical Exam   Constitutional: She is oriented to person, place, and time. She appears well-developed and well-nourished.   HENT:   Head: Normocephalic.   Nose: Nose normal.   Mouth/Throat: Oropharynx is clear and moist.   Neck: No JVD present. No thyromegaly present.   Cardiovascular: Normal rate, regular rhythm, normal heart sounds and intact distal pulses.   Pulmonary/Chest: Normal expansion, hyperinflation, symmetric chest wall expansion, effort normal and breath sounds normal.   Abdominal: Soft. Bowel sounds are normal.   Musculoskeletal:         General: Normal range of motion.      Cervical back: Normal range of motion and neck supple.   Lymphadenopathy: No supraclavicular adenopathy is present.     She has no cervical adenopathy.   Neurological: She is alert and oriented to person, place, and time. She has normal reflexes.   Skin: Skin is warm and dry.   Psychiatric: She has a normal mood and affect. Her behavior is normal.     Personal Diagnostic Review  none pertinent        3/13/2025     1:36 PM 10/10/2024     8:49 AM 7/27/2024     9:55 PM 7/27/2024     9:37 PM 7/27/2024     9:27 PM 7/27/2024     9:20 PM 5/9/2024     3:10 PM   Pulmonary Function Tests   SpO2 98 % 96 % 97 % 100 %  95 % 97 %   Height 5' 9" (1.753 m)    5' 9" (1.753 m)     Weight 146.1 kg (322 lb) 146.4 kg (322 lb 12.8 oz)   145.2 kg (320 lb)     BMI (Calculated) 47.5    47.2           Assessment:       1. SOB (shortness of breath)        Encounter Medications[2]  Orders Placed This Encounter   Procedures    X-Ray Chest PA And Lateral     Standing Status:   Future     Expected Date:   3/13/2025     Expiration Date:   3/13/2026     May the Radiologist modify the order per protocol to meet the clinical needs of the patient?:   Yes    Complete PFT with bronchodilator     Standing Status:   Future     Expiration " Date:   3/13/2026     Release to patient:   Immediate       Plan:       Problem List Items Addressed This Visit    None  Visit Diagnoses         SOB (shortness of breath)    -  Primary    Relevant Orders    Complete PFT with bronchodilator    X-Ray Chest PA And Lateral                           [1]   Social History  Tobacco Use    Smoking status: Every Day     Types: Cigarettes     Passive exposure: Past    Smokeless tobacco: Never    Tobacco comments:     Patient states she smokes marijuana for pain control, working on a receiving a card.     Substance Use Topics    Alcohol use: Never    Drug use: Never   [2]   Outpatient Encounter Medications as of 3/13/2025   Medication Sig Dispense Refill    albuterol (VENTOLIN HFA) 90 mcg/actuation inhaler Inhale 2 puffs into the lungs every 4 (four) hours as needed for Wheezing. Rescue 36 g 0    albuterol-ipratropium (DUO-NEB) 2.5 mg-0.5 mg/3 mL nebulizer solution USE 1 AMPULE IN NEBULIZER EVERY 6 HOURS AS NEEDED FOR WHEEZING 90 mL 2    cetirizine (ZYRTEC) 10 MG tablet Take 1 tablet (10 mg total) by mouth once daily. 90 tablet 1    EPINEPHrine (EPIPEN) 0.3 mg/0.3 mL AtIn Inject 0.3 mLs (0.3 mg total) into the muscle as needed (exposure to allergen). 1 each 0    EScitalopram oxalate (LEXAPRO) 20 MG tablet Take 20 mg by mouth.      ferrous sulfate 325 (65 FE) MG EC tablet Take 325 mg by mouth once daily at 6am.      gabapentin (NEURONTIN) 300 MG capsule TAKE 1 CAPSULE BY MOUTH ONCE DAILY FOR 7 DAYS AND 1 TWICE DAILY      HYDROcodone-acetaminophen (NORCO) 5-325 mg per tablet Take 1 tablet by mouth every 6 (six) hours as needed for Pain. 12 tablet 0    ibuprofen (ADVIL,MOTRIN) 800 MG tablet Take 1 tablet (800 mg total) by mouth every 6 (six) hours as needed for Pain. 20 tablet 0    montelukast (SINGULAIR) 10 mg tablet Take 1 tablet (10 mg total) by mouth every evening. 90 tablet 1    omeprazole (PRILOSEC) 40 MG capsule Take 1 capsule (40 mg total) by mouth every morning. 30  capsule 0    ondansetron (ZOFRAN-ODT) 4 MG TbDL Take 1 tablet (4 mg total) by mouth 2 (two) times daily. 20 tablet 0    fluticasone-salmeterol diskus inhaler 250-50 mcg Inhale 1 puff into the lungs 2 (two) times daily. Controller 180 each 5    [DISCONTINUED] ADVAIR HFA 45-21 mcg/actuation HFAA inhaler Inhale 2 puffs into the lungs 2 (two) times daily. (Patient not taking: Reported on 3/13/2025)      [DISCONTINUED] benzonatate (TESSALON) 100 MG capsule Take 200 mg by mouth. (Patient not taking: Reported on 3/13/2025)      [DISCONTINUED] diazePAM (VALIUM) 5 MG tablet Take 1 tablet (5 mg total) by mouth every 6 (six) hours as needed for Anxiety. 1 tablet 0    [DISCONTINUED] diclofenac (VOLTAREN) 75 MG EC tablet Take 1 tablet (75 mg total) by mouth once daily. (Patient not taking: Reported on 9/1/2023) 30 tablet 5    [DISCONTINUED] ipratropium (ATROVENT) 21 mcg (0.03 %) nasal spray 2 sprays by Each Nostril route 3 (three) times daily. (Patient not taking: Reported on 3/13/2025) 30 mL 0    [DISCONTINUED] norelgestromin-ethinyl estradiol (ORTHO EVRA) 150-35 mcg/24 hr Place 1 patch onto the skin once a week. 4 patch 11    [DISCONTINUED] norgestimate-ethinyl estradioL (TRI-LO-SPRINTEC) 0.18/0.215/0.25 mg-25 mcg tablet Take 1 tablet by mouth once daily. 30 tablet 5    [DISCONTINUED] oxyCODONE-acetaminophen (PERCOCET) 5-325 mg per tablet Take 1 tablet by mouth every 4 (four) hours as needed for Pain. (Patient not taking: Reported on 10/10/2024) 45 tablet 0    [DISCONTINUED] pregabalin (LYRICA) 75 MG capsule Take 75 mg by mouth 2 (two) times daily. (Patient not taking: Reported on 10/10/2024)      [DISCONTINUED] promethazine (PHENERGAN) 25 MG suppository Place 1 suppository (25 mg total) rectally every 6 (six) hours as needed for Nausea. (Patient not taking: Reported on 10/10/2024) 10 suppository 0    [DISCONTINUED] promethazine (PHENERGAN) 25 MG tablet Take 1 tablet (25 mg total) by mouth every 4 (four) hours. (Patient not  taking: Reported on 10/10/2024) 45 tablet 0    [DISCONTINUED] tiZANidine (ZANAFLEX) 4 MG tablet TAKE 1 TABLET BY MOUTH EVERY DAY AT BEDTIME AS NEEDED (Patient not taking: Reported on 10/10/2024) 60 tablet 0     No facility-administered encounter medications on file as of 3/13/2025.

## 2025-03-13 NOTE — ASSESSMENT & PLAN NOTE
Patient has a lifelong history of asthma and allergies the little bit better until about 3 years ago when she developed pneumonia and she has been much worse since then she has reflux postnasal drip overweight deconditioned these are all contributing to her shortness of breath were going to add inhaled steroids and bronchodilators.  Were going to add chest x-ray and PFTs will see her back in 1

## 2025-04-14 ENCOUNTER — TELEPHONE (OUTPATIENT)
Dept: PULMONOLOGY | Facility: CLINIC | Age: 20
End: 2025-04-14
Payer: COMMERCIAL

## 2025-04-14 NOTE — TELEPHONE ENCOUNTER
----- Message from Tech Laturina sent at 4/14/2025  8:54 AM CDT -----  Who Called: ART (MOM) Caller is requesting a sooner appointment. Caller declined first available appointment listed below. Caller will not accept being placed on the waitlist and is requesting a message be sent to doctor.When is the first available appointment?06/2025Options offered (Virtual Visit, Urgent Care): Symptoms:Preferred Method of Contact: Phone CallPatient's Preferred Phone Number on File: [phone] Best Call Back Number, if different:[phone] (mom) Additional Information: Patient was sick on 04/11,  and needs to get her appointments rescheduled.

## 2025-04-14 NOTE — TELEPHONE ENCOUNTER
Called patient and offered her multiple openings but patient states she wants to get back to her house first and look at her and her fiances schedule before scheduling. Will msg us back through the portal

## 2025-04-16 ENCOUNTER — TELEPHONE (OUTPATIENT)
Dept: PULMONOLOGY | Facility: CLINIC | Age: 20
End: 2025-04-16
Payer: COMMERCIAL

## 2025-04-16 NOTE — TELEPHONE ENCOUNTER
----- Message from Robson sent at 4/16/2025 10:04 AM CDT -----  Who Called: Mallory Jimenez the patient know what this is regarding?:need to reschedule for the test she missed on Friday when called back if she doesn't answer please leave a voicemail.Preferred Method of Contact: Phone CallPatient's Preferred Phone Number on File: [phone]

## 2025-05-05 ENCOUNTER — CLINICAL SUPPORT (OUTPATIENT)
Dept: PULMONOLOGY | Facility: HOSPITAL | Age: 20
End: 2025-05-05
Attending: INTERNAL MEDICINE
Payer: COMMERCIAL

## 2025-05-05 ENCOUNTER — OFFICE VISIT (OUTPATIENT)
Dept: PULMONOLOGY | Facility: CLINIC | Age: 20
End: 2025-05-05
Payer: COMMERCIAL

## 2025-05-05 VITALS
RESPIRATION RATE: 16 BRPM | HEIGHT: 69 IN | SYSTOLIC BLOOD PRESSURE: 102 MMHG | BODY MASS INDEX: 43.4 KG/M2 | DIASTOLIC BLOOD PRESSURE: 60 MMHG | HEART RATE: 64 BPM | WEIGHT: 293 LBS | OXYGEN SATURATION: 99 %

## 2025-05-05 DIAGNOSIS — J45.20 MILD INTERMITTENT ASTHMA WITHOUT COMPLICATION: Primary | ICD-10-CM

## 2025-05-05 DIAGNOSIS — Z72.0 TOBACCO USE: ICD-10-CM

## 2025-05-05 DIAGNOSIS — R06.02 SOB (SHORTNESS OF BREATH): ICD-10-CM

## 2025-05-05 DIAGNOSIS — K21.9 GERD WITHOUT ESOPHAGITIS: ICD-10-CM

## 2025-05-05 PROCEDURE — 99999 PR PBB SHADOW E&M-EST. PATIENT-LVL V: CPT | Mod: PBBFAC,,, | Performed by: INTERNAL MEDICINE

## 2025-05-05 PROCEDURE — 94060 EVALUATION OF WHEEZING: CPT

## 2025-05-05 PROCEDURE — 94726 PLETHYSMOGRAPHY LUNG VOLUMES: CPT

## 2025-05-05 PROCEDURE — 3008F BODY MASS INDEX DOCD: CPT | Mod: CPTII,,, | Performed by: INTERNAL MEDICINE

## 2025-05-05 PROCEDURE — 1159F MED LIST DOCD IN RCRD: CPT | Mod: CPTII,,, | Performed by: INTERNAL MEDICINE

## 2025-05-05 PROCEDURE — 94729 DIFFUSING CAPACITY: CPT

## 2025-05-05 PROCEDURE — 3074F SYST BP LT 130 MM HG: CPT | Mod: CPTII,,, | Performed by: INTERNAL MEDICINE

## 2025-05-05 PROCEDURE — 99214 OFFICE O/P EST MOD 30 MIN: CPT | Mod: S$PBB,25,, | Performed by: INTERNAL MEDICINE

## 2025-05-05 PROCEDURE — 27100098 HC SPACER

## 2025-05-05 PROCEDURE — 99215 OFFICE O/P EST HI 40 MIN: CPT | Mod: PBBFAC,25 | Performed by: INTERNAL MEDICINE

## 2025-05-05 PROCEDURE — 3078F DIAST BP <80 MM HG: CPT | Mod: CPTII,,, | Performed by: INTERNAL MEDICINE

## 2025-05-05 NOTE — PROGRESS NOTES
Subjective:       Patient ID: Mallory Rojo is a 19 y.o. female.    Chief Complaint: Shortness of Breath (Follow up/PFT. States that her breathing has been a little bit better. )    History of Present Illness    CHIEF COMPLAINT:  Ms. Rojo presents today for follow up of shortness of breath    RESPIRATORY:  She reports improvement with Advair disc and uses nebulizer treatments every day to every other day as needed. She has history of significant steroid use in childhood for asthma management. She is actively cutting back on smoking with intention to quit completely.    GERD:  She experiences significant reflux, currently managed with nightly omeprazole.    MUSCULOSKELETAL:  She has history of two back surgeries. She previously required wheelchair for mobility but has recently transitioned to walker use. She reports increased walking and improved mobility since obtaining the walker.      ROS:  General: -fever, -chills, -fatigue, -weight gain, -weight loss  Eyes: -vision changes, -redness, -discharge  ENT: -ear pain, -nasal congestion, -sore throat  Cardiovascular: -chest pain, -palpitations, -lower extremity edema  Respiratory: -cough, +shortness of breath  Gastrointestinal: -abdominal pain, -nausea, -vomiting, -diarrhea, -constipation, -blood in stool, +indigestion  Genitourinary: -dysuria, -hematuria, -frequency  Musculoskeletal: -joint pain, -muscle pain, +pain with movement, +back pain, +diminished activity, +difficulty standing up  Skin: -rash, -lesion  Neurological: -headache, -dizziness, -numbness, -tingling  Psychiatric: -anxiety, -depression, -sleep difficulty          Objective:      Physical Exam   Constitutional: She is oriented to person, place, and time. She appears well-developed and well-nourished. She is obese.   HENT:   Head: Normocephalic.   Nose: Nose normal.   Mouth/Throat: Oropharynx is clear and moist.   Neck: No JVD present. No thyromegaly present.   Cardiovascular: Normal rate, regular  "rhythm, normal heart sounds and intact distal pulses.   Pulmonary/Chest: Normal expansion, hyperinflation, symmetric chest wall expansion, effort normal and breath sounds normal.   Abdominal: Soft. Bowel sounds are normal.   Musculoskeletal:         General: Normal range of motion.      Cervical back: Normal range of motion and neck supple.   Lymphadenopathy: No supraclavicular adenopathy is present.     She has no cervical adenopathy.   Neurological: She is alert and oriented to person, place, and time. She has normal reflexes.   Skin: Skin is warm and dry.   Psychiatric: She has a normal mood and affect. Her behavior is normal.     Personal Diagnostic Review  Reviewed pfts        5/5/2025     4:15 PM 5/5/2025     1:15 PM 3/13/2025     1:36 PM 10/10/2024     8:49 AM 7/27/2024     9:55 PM 7/27/2024     9:37 PM 7/27/2024     9:27 PM   Pulmonary Function Tests   FVC  4.36 liters        FVC%  97        FEV1  2.42 liters        FEV1%  62        FEF 25-75  1.3        FEF 25-75%  30        TLC (liters)  6.65 liters        TLC%  115        RV  2.29        RV%  157        DLCO (ml/mmHg sec)  21.83 ml/mmHg sec        DLCO%  84        Peak Flow  255 L/min        SpO2 99 %  98 % 96 % 97 % 100 %    Height 5' 9" (1.753 m)  5' 9" (1.753 m)    5' 9" (1.753 m)   Weight 146 kg (321 lb 14 oz)  146.1 kg (322 lb) 146.4 kg (322 lb 12.8 oz)   145.2 kg (320 lb)   BMI (Calculated) 47.5  47.5    47.2           Current Medications[1]   Assessment:       1. Mild intermittent asthma without complication    2. GERD without esophagitis    3. Tobacco use        Encounter Medications[2]  No orders of the defined types were placed in this encounter.      Plan:       Problem List Items Addressed This Visit          Pulmonary    Asthma - Primary       GI    GERD without esophagitis       Other    Tobacco use         Assessment & Plan    J45.20 Mild intermittent asthma without complication  K21.9 GERD without esophagitis  Z72.0 Tobacco " use    IMPRESSION:  - Assessed asthma management, noting improvement with Advair but recognizing need for continued treatment.  - Evaluated reflux symptoms and recommended adjustments to omeprazole regimen: Take 30 minutes before supper on an empty stomach, do not lay down for 3 hours after eating.  - Reviewed pulmonary function test results, indicating obstructive pattern consistent with asthma rather than structural disease like COPD or emphysema, showing reversible obstruction typical of asthma with potential for improvement over time.  - Considered potential for lung function improvement with continued treatment, weight loss, and increased activity as tolerated.    MILD INTERMITTENT ASTHMA WITHOUT COMPLICATION:  - Continued Advair disc for asthma management.  - Continued nebulizer treatments every day or every other day as needed.  - Added Singulair: Take at night.  - Continued Benadryl as needed.    GERD WITHOUT ESOPHAGITIS:  - Discussed relationship between reflux, weight, and respiratory symptoms.    TOBACCO USE:  - Explained importance of smoking cessation in improving respiratory function.  - Ms. Rojo to continue efforts to quit smoking completely.    LIFESTYLE CHANGES:  - Explained importance of weight loss in improving respiratory function.  - Recommend increasing physical activity as tolerated, considering back limitations, including continuing gardening activities for light exercise.  - Ms. Rojo to pursue weight loss through calorie deficit and increased protein intake.            This note was generated with the assistance of ambient listening technology. Verbal consent was obtained by the patient and accompanying visitor(s) for the recording of patient appointment to facilitate this note. I attest to having reviewed and edited the generated note for accuracy, though some syntax or spelling errors may persist. Please contact the author of this note for any clarification.                  [1]    Current Outpatient Medications:     albuterol (VENTOLIN HFA) 90 mcg/actuation inhaler, Inhale 2 puffs into the lungs every 4 (four) hours as needed for Wheezing. Rescue, Disp: 36 g, Rfl: 0    albuterol-ipratropium (DUO-NEB) 2.5 mg-0.5 mg/3 mL nebulizer solution, USE 1 AMPULE IN NEBULIZER EVERY 6 HOURS AS NEEDED FOR WHEEZING, Disp: 90 mL, Rfl: 2    cetirizine (ZYRTEC) 10 MG tablet, Take 1 tablet (10 mg total) by mouth once daily., Disp: 90 tablet, Rfl: 1    EPINEPHrine (EPIPEN) 0.3 mg/0.3 mL AtIn, Inject 0.3 mLs (0.3 mg total) into the muscle as needed (exposure to allergen)., Disp: 1 each, Rfl: 0    EScitalopram oxalate (LEXAPRO) 20 MG tablet, Take 20 mg by mouth., Disp: , Rfl:     ferrous sulfate 325 (65 FE) MG EC tablet, Take 325 mg by mouth once daily at 6am., Disp: , Rfl:     fluticasone-salmeterol diskus inhaler 250-50 mcg, Inhale 1 puff into the lungs 2 (two) times daily. Controller, Disp: 180 each, Rfl: 5    gabapentin (NEURONTIN) 300 MG capsule, TAKE 1 CAPSULE BY MOUTH ONCE DAILY FOR 7 DAYS AND 1 TWICE DAILY, Disp: , Rfl:     HYDROcodone-acetaminophen (NORCO) 5-325 mg per tablet, Take 1 tablet by mouth every 6 (six) hours as needed for Pain., Disp: 12 tablet, Rfl: 0    ibuprofen (ADVIL,MOTRIN) 800 MG tablet, Take 1 tablet (800 mg total) by mouth every 6 (six) hours as needed for Pain., Disp: 20 tablet, Rfl: 0    montelukast (SINGULAIR) 10 mg tablet, Take 1 tablet (10 mg total) by mouth every evening., Disp: 90 tablet, Rfl: 1    omeprazole (PRILOSEC) 40 MG capsule, Take 1 capsule (40 mg total) by mouth every morning., Disp: 30 capsule, Rfl: 0    ondansetron (ZOFRAN-ODT) 4 MG TbDL, Take 1 tablet (4 mg total) by mouth 2 (two) times daily., Disp: 20 tablet, Rfl: 0  [2]   Outpatient Encounter Medications as of 5/5/2025   Medication Sig Dispense Refill    albuterol (VENTOLIN HFA) 90 mcg/actuation inhaler Inhale 2 puffs into the lungs every 4 (four) hours as needed for Wheezing. Rescue 36 g 0     albuterol-ipratropium (DUO-NEB) 2.5 mg-0.5 mg/3 mL nebulizer solution USE 1 AMPULE IN NEBULIZER EVERY 6 HOURS AS NEEDED FOR WHEEZING 90 mL 2    cetirizine (ZYRTEC) 10 MG tablet Take 1 tablet (10 mg total) by mouth once daily. 90 tablet 1    EPINEPHrine (EPIPEN) 0.3 mg/0.3 mL AtIn Inject 0.3 mLs (0.3 mg total) into the muscle as needed (exposure to allergen). 1 each 0    EScitalopram oxalate (LEXAPRO) 20 MG tablet Take 20 mg by mouth.      ferrous sulfate 325 (65 FE) MG EC tablet Take 325 mg by mouth once daily at 6am.      fluticasone-salmeterol diskus inhaler 250-50 mcg Inhale 1 puff into the lungs 2 (two) times daily. Controller 180 each 5    gabapentin (NEURONTIN) 300 MG capsule TAKE 1 CAPSULE BY MOUTH ONCE DAILY FOR 7 DAYS AND 1 TWICE DAILY      HYDROcodone-acetaminophen (NORCO) 5-325 mg per tablet Take 1 tablet by mouth every 6 (six) hours as needed for Pain. 12 tablet 0    ibuprofen (ADVIL,MOTRIN) 800 MG tablet Take 1 tablet (800 mg total) by mouth every 6 (six) hours as needed for Pain. 20 tablet 0    montelukast (SINGULAIR) 10 mg tablet Take 1 tablet (10 mg total) by mouth every evening. 90 tablet 1    omeprazole (PRILOSEC) 40 MG capsule Take 1 capsule (40 mg total) by mouth every morning. 30 capsule 0    ondansetron (ZOFRAN-ODT) 4 MG TbDL Take 1 tablet (4 mg total) by mouth 2 (two) times daily. 20 tablet 0     No facility-administered encounter medications on file as of 5/5/2025.

## 2025-05-06 PROCEDURE — 94060 EVALUATION OF WHEEZING: CPT | Mod: 26,,, | Performed by: INTERNAL MEDICINE

## 2025-05-06 PROCEDURE — 94726 PLETHYSMOGRAPHY LUNG VOLUMES: CPT | Mod: 26,,, | Performed by: INTERNAL MEDICINE

## 2025-05-06 PROCEDURE — 94729 DIFFUSING CAPACITY: CPT | Mod: 26,,, | Performed by: INTERNAL MEDICINE

## 2025-05-06 NOTE — PROCEDURES
Pulmonary function test  Forced vital capacity 4.74 L or 105% predicted   FEV1 2.96 L 76% predicted  FEV1 ratio 62%  Broncho reactivity small airways   Hyperinflation  Normal DLCO  Obstructive flow volume loop   Moderate restrictive ventilatory impairment hyperinflation small airways broncho reactivity

## 2025-08-05 DIAGNOSIS — M54.16 LUMBAR RADICULITIS: Primary | ICD-10-CM

## 2025-08-05 DIAGNOSIS — M48.061 STENOSIS, SPINAL, LUMBAR: ICD-10-CM

## 2025-08-08 ENCOUNTER — CLINICAL SUPPORT (OUTPATIENT)
Dept: REHABILITATION | Facility: HOSPITAL | Age: 20
End: 2025-08-08
Payer: COMMERCIAL

## 2025-08-08 DIAGNOSIS — M54.16 LUMBAR RADICULITIS: ICD-10-CM

## 2025-08-08 DIAGNOSIS — M48.061 SPINAL STENOSIS OF LUMBAR REGION WITHOUT NEUROGENIC CLAUDICATION: Primary | ICD-10-CM

## 2025-08-08 PROBLEM — M54.50 CHRONIC MIDLINE LOW BACK PAIN WITHOUT SCIATICA: Status: RESOLVED | Noted: 2022-05-11 | Resolved: 2025-08-08

## 2025-08-08 PROBLEM — G89.29 CHRONIC MIDLINE LOW BACK PAIN WITHOUT SCIATICA: Status: RESOLVED | Noted: 2022-05-11 | Resolved: 2025-08-08

## 2025-08-08 PROBLEM — M54.40 ACUTE BILATERAL LOW BACK PAIN WITH SCIATICA: Status: RESOLVED | Noted: 2023-01-18 | Resolved: 2025-08-08

## 2025-08-08 PROBLEM — M25.69 DECREASED RANGE OF MOTION OF TRUNK AND BACK: Status: RESOLVED | Noted: 2023-01-18 | Resolved: 2025-08-08

## 2025-08-08 PROCEDURE — 97161 PT EVAL LOW COMPLEX 20 MIN: CPT | Mod: PN

## 2025-08-08 PROCEDURE — 97110 THERAPEUTIC EXERCISES: CPT | Mod: PN

## 2025-08-08 NOTE — LETTER
August 8, 2025  PINKY Alonso  [address]  [address]  [facility]  [place] [address]      To whom it may concern,     The attached plan of care is being sent to you for review and reference.    You may indicate your approval by signing the document electronically, or by faxing/mailing a signed copy of the final page of this document back to the attention of Koha Kimble, PT:         Plan of Care 8/8/25   Effective from: 8/8/2025  Effective to: 9/8/2025    Plan ID: [id number]            Participants as of Finalize on 8/8/2025    Name Type Comments Contact Info    PINKY Alonso Referring Provider  [phone]    Khoa Kimble PT Physical Therapist         Last Plan Note     Author: Khoa Kimble PT Status: Signed Last edited: 8/8/2025  1:15 PM         Outpatient Rehab    Physical Therapy Evaluation    Patient Name: Mallory Rojo  MRN: [id number]  YOB: 2005  Encounter Date: 8/8/2025    Therapy Diagnosis:   Encounter Diagnoses   Name Primary?    Spinal stenosis of lumbar region without neurogenic claudication Yes    Lumbar radiculitis      Physician: Clarissa Campbell FNP    Physician Orders: Eval and Treat  Medical Diagnosis: Lumbar radiculitis  Stenosis, spinal, lumbar  Surgical Diagnosis: Not applicable for this Episode   Surgical Date: Not applicable for this Episode  Days Since Last Surgery: Not applicable for this Episode    Visit # / Visits Authorized:  1 / 1  Insurance Authorization Period: 8/5/2025 to 8/5/2026  Date of Evaluation: 8/8/2025  Plan of Care Certification: 8/8/2025 to 9/8/2025      Time In: 1245   Time Out: 1330  Total Time (in minutes): 45   Total Billable Time (in minutes): 45    Intake Outcome Measure for FOTO Survey    Therapist reviewed FOTO scores for Mallory Rojo on 8/8/2025.   FOTO report - see Media section or FOTO account episode details.     Intake Score (%): 32    Precautions:       Subjective   History of Present Illness  Mallory is a 19 y.o. female  who reports to physical therapy with a chief concern of low back pain.     The patient reports a medical diagnosis of lumbar radiculopathy , lumbar spinal stenosis. The patient has experienced this issue since 08/08/25.                     Dominant Hand: Right  History of Present Condition/Illness: Pt voices she has been having low back pain for greater than 7 years .  Pt has had back sx laminectomy 2 years ago.  Pt voices she continues to have back pain . Pt voices she is going to get tested for MS and rheumatoid arthritis.  Pt voices she has weakness in lower extremities and left lower extremity is weaker than the other side. Pt voices she has worse pain with bending over too quick and coming up quick . Pt voices walking hurts her lower hip and back .  Pt states she can only last about 15 min shopping          Pain     Patient reports a current pain level of 3/10. Pain at best is reported as 3/10. Pain at worst is reported as 9/10.   Clinical Progression (since onset): Worsening  Pain Qualities: Aching, Knife-like, Pulling, Tightness, Tenderness, Sharp, Radiating, Dull, Burning, Discomfort  Pain-Aggravating Factors: Bending, Lifting, Exercise, Lying down, Standing, Stretching, Walking, Twisting, Sitting, Squatting, Movement         Treatment History  Treatments  Previously Received Treatments: No  Currently Receiving Treatments: No      Past Medical History/Physical Systems Review:   Mallory Rojo  has a past medical history of Anxiety, Asthma, and Obesity.    Mallory Rojo  has a past surgical history that includes Back surgery; Tonsillectomy; and Lumbar laminectomy (N/A, 9/27/2023).    Mallory has a current medication list which includes the following prescription(s): albuterol, albuterol-ipratropium, cetirizine, epinephrine, escitalopram oxalate, ferrous sulfate, fluticasone-salmeterol 250-50 mcg/dose, gabapentin, hydrocodone-acetaminophen, ibuprofen, montelukast, omeprazole, and ondansetron.    Review of  patient's allergies indicates:   Allergen Reactions    Cherry flavor     Aller xt-tree pollen-white oak Other (See Comments)    Cat hair standardized allergenic extract Other (See Comments)    Cobalt Dermatitis    Grass pollen-red top, standard Other (See Comments)    Imidazolidinyl urea Dermatitis    Neomycin Dermatitis    Urea Dermatitis    Weed pollen-dog fennel Other (See Comments)    Cefdinir Rash        Objective       Hip Range of Motion   Right Hip   Active (deg) Passive (deg) Pain   Flexion 100       Extension 5       ABduction         ADduction         External Rotation 90/90         External Rotation Prone         Internal Rotation 90/90 30       Internal Rotation Prone             Left Hip   Active (deg) Passive (deg) Pain   Flexion 95       Extension 10       ABduction         ADduction         External Rotation 90/90         External Rotation Prone         Internal Rotation 90/90 25       Internal Rotation Prone                  Knee Range of Motion   Right Knee   Active (deg) Passive (deg) Pain   Flexion 130       Extension 0           Left Knee   Active (deg) Passive (deg) Pain   Flexion 130       Extension 0                          Hip Strength - Planes of Motion   Right Strength Right Pain Left Strength Left  Pain   Flexion (L2) 3+   3+     Extension 3+   3+     ABduction 3+   3+     ADduction 3+   3+     Internal Rotation 3+   3+     External Rotation 3+   3+         Knee Strength   Right Strength Right Pain Left Strength Left  Pain   Flexion (S2) 4+   4+     Prone Flexion           Extension (L3) 4+   4+            Ankle/Foot Strength - Planes of Motion   Right Strength Right Pain Left Strength Left  Pain   Dorsiflexion (L4) 3+   4     Plantar Flexion (S1) 4   4     Inversion     4     Eversion 4   4     Great Toe Flexion 4   4     Great Toe Extension (L5) 4   4     Lesser Toes Flexion           Lesser Toes Extension                     Treatment:  Therapeutic Exercise  TE 1: pt  performed home ex program      Time Entry(in minutes):       Assessment & Plan   Assessment  Mallory presents with a condition of Low complexity.   Presentation of Symptoms: Evolving  Will Comorbidities Impact Care: Yes  Chronic low back pain 7 years     Functional Limitations: Activity tolerance, Ambulating on uneven surfaces, Completing work/school activities, Functional mobility, Disrupted sleep pattern, Performing household chores, Participating in leisure activities, Painful locomotion/ambulation, Standing tolerance, Participating in sports, Range of motion, Getting off the floor  Impairments: Abnormal or restricted range of motion, Pain with functional activity, Impaired physical strength, Activity intolerance, Abnormal muscle firing, Abnormal muscle tone  Personal Factors Affecting Prognosis: Physical limitations, Pain    Prognosis: Good  Assessment Details: Pt has tight low back , paraspinals , glutes .  Weak core .  Pt will benefit from low back rehab , cardio , flexibility core strength.     Plan  From a physical therapy perspective, the patient would benefit from: Skilled Rehab Services    Planned therapy interventions include: Therapeutic exercise, Therapeutic activities, Neuromuscular re-education, and Manual therapy.    Planned modalities to include: Electrical stimulation - passive/unattended and Ultrasound.        Visit Frequency: 2 times Per Week for 4 Weeks.       This plan was discussed with Patient and Therapy assistant.   Discussion participants: Agreed Upon Plan of Care  Plan details:   Plan of care Certification:  8/6/2025  to 9/6/2025 . Outpatient Physical Therapy 2 times weekly for 4 weeks to include the following interventions: Electrical Stimulation ifc , Manual Therapy, Neuromuscular Re-ed, Orthotic Management and Training, Patient Education, Therapeutic Activities, Therapeutic Exercise, and Ultrasound.  Plan of care has been reestablished with Sherri CARR and Rochelle CARR ,  josiah pickett LPTA Khoa Kimble PT           The patient's spiritual, cultural, and educational needs were considered, and the patient is agreeable to the plan of care and goals.     Education  Education was done with Patient. The patient's learning style includes Demonstration, Listening, and Pictures/video. The patient Verbalizes understanding and Demonstrates understanding.                 Goals:   Active       long term goals.        pt will be able to shop greater than 30 min with pain less than 5/10        Start:  08/08/25            increase all lower extremity strength to 5/5        Start:  08/08/25    Expected End:  10/08/25            be able to decrease worse pain to 5/10        Start:  08/08/25    Expected End:  10/08/25               short term goals        pt will be independent with home ex program        Start:  08/08/25    Expected End:  09/08/25            pt will be able to tolerate 20 min of cardio        Start:  08/08/25    Expected End:  09/08/25            be able to increase bilateral hip flexion to 120 degrees        Start:  08/08/25    Expected End:  09/08/25            be able to decrease worse pain down to 7/10        Start:  08/08/25    Expected End:  09/08/25                Khoa Kimble PT          I CERTIFY THE NEED FOR THESE SERVICES FURNISHED UNDER THIS PLAN OF TREATMENT AND WHILE UNDER MY CARE.    Physician's comments:      Physician's Signature: ___________________________________________________            Current Participants as of 8/8/2025    Name Type Comments Contact Info    PINKY Alonso Referring Provider  [phone]    Signature pending    Khoa Kimble PT Physical Therapist                  Sincerely,      Khoa Kimble PT  [facility]                                                            Dear Khoa Kimble, PT,    RE: Ms. Mallory Rojo, MRN: [id number]    I certify that I have reviewed the attached plan of care and agree to the details  within.        ___________________________  ___________________________  Provider Printed Name   Provider Signed Name      ___________________________  Date and Time

## 2025-08-08 NOTE — PROGRESS NOTES
Outpatient Rehab    Physical Therapy Evaluation    Patient Name: Mallory Rojo  MRN: [id number]  YOB: 2005  Encounter Date: 8/8/2025    Therapy Diagnosis:   Encounter Diagnoses   Name Primary?    Spinal stenosis of lumbar region without neurogenic claudication Yes    Lumbar radiculitis      Physician: Clarissa Campbell FNP    Physician Orders: Eval and Treat  Medical Diagnosis: Lumbar radiculitis  Stenosis, spinal, lumbar  Surgical Diagnosis: Not applicable for this Episode   Surgical Date: Not applicable for this Episode  Days Since Last Surgery: Not applicable for this Episode    Visit # / Visits Authorized:  1 / 1  Insurance Authorization Period: 8/5/2025 to 8/5/2026  Date of Evaluation: 8/8/2025  Plan of Care Certification: 8/8/2025 to 9/8/2025      Time In: 1245   Time Out: 1330  Total Time (in minutes): 45   Total Billable Time (in minutes): 45    Intake Outcome Measure for FOTO Survey    Therapist reviewed FOTO scores for Mallory Rojo on 8/8/2025.   FOTO report - see Media section or FOTO account episode details.     Intake Score (%): 32    Precautions:       Subjective   History of Present Illness  Mallory is a 19 y.o. female who reports to physical therapy with a chief concern of low back pain.     The patient reports a medical diagnosis of lumbar radiculopathy , lumbar spinal stenosis. The patient has experienced this issue since 08/08/25.                     Dominant Hand: Right  History of Present Condition/Illness: Pt voices she has been having low back pain for greater than 7 years .  Pt has had back sx laminectomy 2 years ago.  Pt voices she continues to have back pain . Pt voices she is going to get tested for MS and rheumatoid arthritis.  Pt voices she has weakness in lower extremities and left lower extremity is weaker than the other side. Pt voices she has worse pain with bending over too quick and coming up quick . Pt voices walking hurts her lower hip and back .  Pt states she can  only last about 15 min shopping          Pain     Patient reports a current pain level of 3/10. Pain at best is reported as 3/10. Pain at worst is reported as 9/10.   Clinical Progression (since onset): Worsening  Pain Qualities: Aching, Knife-like, Pulling, Tightness, Tenderness, Sharp, Radiating, Dull, Burning, Discomfort  Pain-Aggravating Factors: Bending, Lifting, Exercise, Lying down, Standing, Stretching, Walking, Twisting, Sitting, Squatting, Movement         Treatment History  Treatments  Previously Received Treatments: No  Currently Receiving Treatments: No      Past Medical History/Physical Systems Review:   Mallory Rojo  has a past medical history of Anxiety, Asthma, and Obesity.    Mallory Rojo  has a past surgical history that includes Back surgery; Tonsillectomy; and Lumbar laminectomy (N/A, 9/27/2023).    Mallory has a current medication list which includes the following prescription(s): albuterol, albuterol-ipratropium, cetirizine, epinephrine, escitalopram oxalate, ferrous sulfate, fluticasone-salmeterol 250-50 mcg/dose, gabapentin, hydrocodone-acetaminophen, ibuprofen, montelukast, omeprazole, and ondansetron.    Review of patient's allergies indicates:   Allergen Reactions    Cherry flavor     Aller xt-tree pollen-white oak Other (See Comments)    Cat hair standardized allergenic extract Other (See Comments)    Cobalt Dermatitis    Grass pollen-red top, standard Other (See Comments)    Imidazolidinyl urea Dermatitis    Neomycin Dermatitis    Urea Dermatitis    Weed pollen-dog fennel Other (See Comments)    Cefdinir Rash        Objective       Hip Range of Motion   Right Hip   Active (deg) Passive (deg) Pain   Flexion 100       Extension 5       ABduction         ADduction         External Rotation 90/90         External Rotation Prone         Internal Rotation 90/90 30       Internal Rotation Prone             Left Hip   Active (deg) Passive (deg) Pain   Flexion 95       Extension 10        ABduction         ADduction         External Rotation 90/90         External Rotation Prone         Internal Rotation 90/90 25       Internal Rotation Prone                  Knee Range of Motion   Right Knee   Active (deg) Passive (deg) Pain   Flexion 130       Extension 0           Left Knee   Active (deg) Passive (deg) Pain   Flexion 130       Extension 0                          Hip Strength - Planes of Motion   Right Strength Right Pain Left Strength Left  Pain   Flexion (L2) 3+   3+     Extension 3+   3+     ABduction 3+   3+     ADduction 3+   3+     Internal Rotation 3+   3+     External Rotation 3+   3+         Knee Strength   Right Strength Right Pain Left Strength Left  Pain   Flexion (S2) 4+   4+     Prone Flexion           Extension (L3) 4+   4+            Ankle/Foot Strength - Planes of Motion   Right Strength Right Pain Left Strength Left  Pain   Dorsiflexion (L4) 3+   4     Plantar Flexion (S1) 4   4     Inversion     4     Eversion 4   4     Great Toe Flexion 4   4     Great Toe Extension (L5) 4   4     Lesser Toes Flexion           Lesser Toes Extension                     Treatment:  Therapeutic Exercise  TE 1: pt performed home ex program      Time Entry(in minutes):       Assessment & Plan   Assessment  Mallory presents with a condition of Low complexity.   Presentation of Symptoms: Evolving  Will Comorbidities Impact Care: Yes  Chronic low back pain 7 years     Functional Limitations: Activity tolerance, Ambulating on uneven surfaces, Completing work/school activities, Functional mobility, Disrupted sleep pattern, Performing household chores, Participating in leisure activities, Painful locomotion/ambulation, Standing tolerance, Participating in sports, Range of motion, Getting off the floor  Impairments: Abnormal or restricted range of motion, Pain with functional activity, Impaired physical strength, Activity intolerance, Abnormal muscle firing, Abnormal muscle tone  Personal Factors Affecting  Prognosis: Physical limitations, Pain    Prognosis: Good  Assessment Details: Pt has tight low back , paraspinals , glutes .  Weak core .  Pt will benefit from low back rehab , cardio , flexibility core strength.     Plan  From a physical therapy perspective, the patient would benefit from: Skilled Rehab Services    Planned therapy interventions include: Therapeutic exercise, Therapeutic activities, Neuromuscular re-education, and Manual therapy.    Planned modalities to include: Electrical stimulation - passive/unattended and Ultrasound.        Visit Frequency: 2 times Per Week for 4 Weeks.       This plan was discussed with Patient and Therapy assistant.   Discussion participants: Agreed Upon Plan of Care  Plan details:   Plan of care Certification:  8/6/2025  to 9/6/2025 . Outpatient Physical Therapy 2 times weekly for 4 weeks to include the following interventions: Electrical Stimulation ifc , Manual Therapy, Neuromuscular Re-ed, Orthotic Management and Training, Patient Education, Therapeutic Activities, Therapeutic Exercise, and Ultrasound.  Plan of care has been reestablished with Sherri CARR and josiah Giles PT           The patient's spiritual, cultural, and educational needs were considered, and the patient is agreeable to the plan of care and goals.     Education  Education was done with Patient. The patient's learning style includes Demonstration, Listening, and Pictures/video. The patient Verbalizes understanding and Demonstrates understanding.                 Goals:   Active       long term goals.        pt will be able to shop greater than 30 min with pain less than 5/10        Start:  08/08/25            increase all lower extremity strength to 5/5        Start:  08/08/25    Expected End:  10/08/25            be able to decrease worse pain to 5/10        Start:  08/08/25    Expected End:  10/08/25               short term goals        pt will be  independent with home ex program        Start:  08/08/25    Expected End:  09/08/25            pt will be able to tolerate 20 min of cardio        Start:  08/08/25    Expected End:  09/08/25            be able to increase bilateral hip flexion to 120 degrees        Start:  08/08/25    Expected End:  09/08/25            be able to decrease worse pain down to 7/10        Start:  08/08/25    Expected End:  09/08/25                Khoa Kimble, PT          I CERTIFY THE NEED FOR THESE SERVICES FURNISHED UNDER THIS PLAN OF TREATMENT AND WHILE UNDER MY CARE.    Physician's comments:      Physician's Signature: ___________________________________________________

## 2025-08-08 NOTE — LETTER
August 8, 2025  PINKY Alonso  [address]  [address]  [facility]  [place] [address]      To whom it may concern,     The attached plan of care is being sent to you for review and reference.    You may indicate your approval by signing the document electronically, or by faxing/mailing a signed copy of the final page of this document back to the attention of Khoa Kimble, PT:         Plan of Care 8/8/25   Effective from: 8/8/2025  Effective to: 9/8/2025    Plan ID: [id number]            Participants as of Finalize on 8/8/2025    Name Type Comments Contact Info    PINKY Alonso Referring Provider  [phone]    Khoa Kimble PT Physical Therapist         Last Plan Note     Author: Khoa Kimble PT Status: Signed Last edited: 8/8/2025  1:15 PM         Outpatient Rehab    Physical Therapy Evaluation    Patient Name: Mallory Rojo  MRN: [id number]  YOB: 2005  Encounter Date: 8/8/2025    Therapy Diagnosis:   Encounter Diagnoses   Name Primary?    Spinal stenosis of lumbar region without neurogenic claudication Yes    Lumbar radiculitis      Physician: Clarissa Campbell FNP    Physician Orders: Eval and Treat  Medical Diagnosis: Lumbar radiculitis  Stenosis, spinal, lumbar  Surgical Diagnosis: Not applicable for this Episode   Surgical Date: Not applicable for this Episode  Days Since Last Surgery: Not applicable for this Episode    Visit # / Visits Authorized:  1 / 1  Insurance Authorization Period: 8/5/2025 to 8/5/2026  Date of Evaluation: 8/8/2025  Plan of Care Certification: 8/8/2025 to 9/8/2025      Time In: 1245   Time Out: 1330  Total Time (in minutes): 45   Total Billable Time (in minutes): 45    Intake Outcome Measure for FOTO Survey    Therapist reviewed FOTO scores for Mallory Rojo on 8/8/2025.   FOTO report - see Media section or FOTO account episode details.     Intake Score (%): 32    Precautions:       Subjective   History of Present Illness  Mallory is a 19 y.o. female  who reports to physical therapy with a chief concern of low back pain.     The patient reports a medical diagnosis of lumbar radiculopathy , lumbar spinal stenosis. The patient has experienced this issue since 08/08/25.                     Dominant Hand: Right  History of Present Condition/Illness: Pt voices she has been having low back pain for greater than 7 years .  Pt has had back sx laminectomy 2 years ago.  Pt voices she continues to have back pain . Pt voices she is going to get tested for MS and rheumatoid arthritis.  Pt voices she has weakness in lower extremities and left lower extremity is weaker than the other side. Pt voices she has worse pain with bending over too quick and coming up quick . Pt voices walking hurts her lower hip and back .  Pt states she can only last about 15 min shopping          Pain     Patient reports a current pain level of 3/10. Pain at best is reported as 3/10. Pain at worst is reported as 9/10.   Clinical Progression (since onset): Worsening  Pain Qualities: Aching, Knife-like, Pulling, Tightness, Tenderness, Sharp, Radiating, Dull, Burning, Discomfort  Pain-Aggravating Factors: Bending, Lifting, Exercise, Lying down, Standing, Stretching, Walking, Twisting, Sitting, Squatting, Movement         Treatment History  Treatments  Previously Received Treatments: No  Currently Receiving Treatments: No      Past Medical History/Physical Systems Review:   Mallory Rojo  has a past medical history of Anxiety, Asthma, and Obesity.    Mallory Rojo  has a past surgical history that includes Back surgery; Tonsillectomy; and Lumbar laminectomy (N/A, 9/27/2023).    Mallory has a current medication list which includes the following prescription(s): albuterol, albuterol-ipratropium, cetirizine, epinephrine, escitalopram oxalate, ferrous sulfate, fluticasone-salmeterol 250-50 mcg/dose, gabapentin, hydrocodone-acetaminophen, ibuprofen, montelukast, omeprazole, and ondansetron.    Review of  patient's allergies indicates:   Allergen Reactions    Cherry flavor     Aller xt-tree pollen-white oak Other (See Comments)    Cat hair standardized allergenic extract Other (See Comments)    Cobalt Dermatitis    Grass pollen-red top, standard Other (See Comments)    Imidazolidinyl urea Dermatitis    Neomycin Dermatitis    Urea Dermatitis    Weed pollen-dog fennel Other (See Comments)    Cefdinir Rash        Objective       Hip Range of Motion   Right Hip   Active (deg) Passive (deg) Pain   Flexion 100       Extension 5       ABduction         ADduction         External Rotation 90/90         External Rotation Prone         Internal Rotation 90/90 30       Internal Rotation Prone             Left Hip   Active (deg) Passive (deg) Pain   Flexion 95       Extension 10       ABduction         ADduction         External Rotation 90/90         External Rotation Prone         Internal Rotation 90/90 25       Internal Rotation Prone                  Knee Range of Motion   Right Knee   Active (deg) Passive (deg) Pain   Flexion 130       Extension 0           Left Knee   Active (deg) Passive (deg) Pain   Flexion 130       Extension 0                          Hip Strength - Planes of Motion   Right Strength Right Pain Left Strength Left  Pain   Flexion (L2) 3+   3+     Extension 3+   3+     ABduction 3+   3+     ADduction 3+   3+     Internal Rotation 3+   3+     External Rotation 3+   3+         Knee Strength   Right Strength Right Pain Left Strength Left  Pain   Flexion (S2) 4+   4+     Prone Flexion           Extension (L3) 4+   4+            Ankle/Foot Strength - Planes of Motion   Right Strength Right Pain Left Strength Left  Pain   Dorsiflexion (L4) 3+   4     Plantar Flexion (S1) 4   4     Inversion     4     Eversion 4   4     Great Toe Flexion 4   4     Great Toe Extension (L5) 4   4     Lesser Toes Flexion           Lesser Toes Extension                     Treatment:  Therapeutic Exercise  TE 1: pt  performed home ex program      Time Entry(in minutes):       Assessment & Plan   Assessment  Mallory presents with a condition of Low complexity.   Presentation of Symptoms: Evolving  Will Comorbidities Impact Care: Yes  Chronic low back pain 7 years     Functional Limitations: Activity tolerance, Ambulating on uneven surfaces, Completing work/school activities, Functional mobility, Disrupted sleep pattern, Performing household chores, Participating in leisure activities, Painful locomotion/ambulation, Standing tolerance, Participating in sports, Range of motion, Getting off the floor  Impairments: Abnormal or restricted range of motion, Pain with functional activity, Impaired physical strength, Activity intolerance, Abnormal muscle firing, Abnormal muscle tone  Personal Factors Affecting Prognosis: Physical limitations, Pain    Prognosis: Good  Assessment Details: Pt has tight low back , paraspinals , glutes .  Weak core .  Pt will benefit from low back rehab , cardio , flexibility core strength.     Plan  From a physical therapy perspective, the patient would benefit from: Skilled Rehab Services    Planned therapy interventions include: Therapeutic exercise, Therapeutic activities, Neuromuscular re-education, and Manual therapy.    Planned modalities to include: Electrical stimulation - passive/unattended and Ultrasound.        Visit Frequency: 2 times Per Week for 4 Weeks.       This plan was discussed with Patient and Therapy assistant.   Discussion participants: Agreed Upon Plan of Care  Plan details:   Plan of care Certification:  8/6/2025  to 9/6/2025 . Outpatient Physical Therapy 2 times weekly for 4 weeks to include the following interventions: Electrical Stimulation ifc , Manual Therapy, Neuromuscular Re-ed, Orthotic Management and Training, Patient Education, Therapeutic Activities, Therapeutic Exercise, and Ultrasound.  Plan of care has been reestablished with Sherri CARR and Rochelle CARR ,  josiah pickett LPTA Khoa Kimble PT           The patient's spiritual, cultural, and educational needs were considered, and the patient is agreeable to the plan of care and goals.     Education  Education was done with Patient. The patient's learning style includes Demonstration, Listening, and Pictures/video. The patient Verbalizes understanding and Demonstrates understanding.                 Goals:   Active       long term goals.        pt will be able to shop greater than 30 min with pain less than 5/10        Start:  08/08/25            increase all lower extremity strength to 5/5        Start:  08/08/25    Expected End:  10/08/25            be able to decrease worse pain to 5/10        Start:  08/08/25    Expected End:  10/08/25               short term goals        pt will be independent with home ex program        Start:  08/08/25    Expected End:  09/08/25            pt will be able to tolerate 20 min of cardio        Start:  08/08/25    Expected End:  09/08/25            be able to increase bilateral hip flexion to 120 degrees        Start:  08/08/25    Expected End:  09/08/25            be able to decrease worse pain down to 7/10        Start:  08/08/25    Expected End:  09/08/25                Khoa Kimble PT          I CERTIFY THE NEED FOR THESE SERVICES FURNISHED UNDER THIS PLAN OF TREATMENT AND WHILE UNDER MY CARE.    Physician's comments:      Physician's Signature: ___________________________________________________            Current Participants as of 8/8/2025    Name Type Comments Contact Info    PINKY Alonso Referring Provider  [phone]    Signature pending    Khoa Kimble PT Physical Therapist                  Sincerely,      Khoa Kimble PT  [facility]                                                            Dear Khoa Kimble, PT,    RE: Ms. Mallory Rojo, MRN: [id number]    I certify that I have reviewed the attached plan of care and agree to the details  within.        ___________________________  ___________________________  Provider Printed Name   Provider Signed Name      ___________________________  Date and Time

## 2025-08-13 ENCOUNTER — CLINICAL SUPPORT (OUTPATIENT)
Dept: REHABILITATION | Facility: HOSPITAL | Age: 20
End: 2025-08-13
Payer: COMMERCIAL

## 2025-08-13 DIAGNOSIS — M54.16 LUMBAR RADICULITIS: ICD-10-CM

## 2025-08-13 DIAGNOSIS — M48.061 SPINAL STENOSIS OF LUMBAR REGION WITHOUT NEUROGENIC CLAUDICATION: Primary | ICD-10-CM

## 2025-08-13 PROCEDURE — 97530 THERAPEUTIC ACTIVITIES: CPT | Mod: PN

## 2025-08-13 PROCEDURE — 97112 NEUROMUSCULAR REEDUCATION: CPT | Mod: PN

## 2025-08-13 PROCEDURE — 97014 ELECTRIC STIMULATION THERAPY: CPT | Mod: PN

## 2025-08-18 ENCOUNTER — HOSPITAL ENCOUNTER (OUTPATIENT)
Dept: RADIOLOGY | Facility: HOSPITAL | Age: 20
Discharge: HOME | End: 2025-08-18
Attending: ANESTHESIOLOGY
Payer: COMMERCIAL

## 2025-08-18 DIAGNOSIS — M25.559 HIP PAIN: ICD-10-CM

## 2025-08-18 PROCEDURE — 73521 X-RAY EXAM HIPS BI 2 VIEWS: CPT | Mod: TC,FY

## (undated) DEVICE — DRAPE INVISISHIELD TOWEL SMALL

## (undated) DEVICE — BAG RECTANGLE RBBRBND 30X36IN

## (undated) DEVICE — GLOVE PROTEXIS PI SYN SURG 6.0

## (undated) DEVICE — SUT BONE WAX SYNTHETIC

## (undated) DEVICE — SYR ONLY LUER LOCK 20CC

## (undated) DEVICE — DRESSING TRANS 4X4 TEGADERM

## (undated) DEVICE — SPONGE COTTON TRAY 4X4IN

## (undated) DEVICE — MATRIX FLOSEAL HEMOSTATIC 10ML

## (undated) DEVICE — DECANTER FLUID TRNSF WHITE 9IN

## (undated) DEVICE — COUNTER NDL FOAM MAGNET 40/70

## (undated) DEVICE — FORCEP BAYONET BIPOLAR 10.5 DISP

## (undated) DEVICE — CORD BIPOLAR 12 FOOT

## (undated) DEVICE — TOWEL OR DISP STRL BLUE 4/PK

## (undated) DEVICE — GOWN NONREINF SET-IN SLV 2XL

## (undated) DEVICE — GLOVE PROTEXIS PI SYN SURG 8.5

## (undated) DEVICE — GOWN POLY REINF BRTH SLV XL

## (undated) DEVICE — GLOVE 6.0 PROTEXIS PI BLUE

## (undated) DEVICE — HANDPIECE ARGYLE YANKAUER 18FR

## (undated) DEVICE — GAUZE SPONGE XRAY 4X4

## (undated) DEVICE — DRAPE THREE-QTR REINF 53X77IN

## (undated) DEVICE — DRAPE INCISE IOBAN 2 13X13IN

## (undated) DEVICE — PENCIL ELECTROSURG HOLST W/BLD

## (undated) DEVICE — SUT STRATAFIX 1 PDS CT-1

## (undated) DEVICE — DRESSING SURGICAL 1/2X1/2

## (undated) DEVICE — TUBE SUCTION MEDI-VAC STERILE

## (undated) DEVICE — U-BAR CHESTPACK III

## (undated) DEVICE — Device

## (undated) DEVICE — HEADREST SOFT TOUCH

## (undated) DEVICE — BLADE SURG #15 CARBON STEEL

## (undated) DEVICE — DRESSING SURGICAL 1X1

## (undated) DEVICE — BLADE SURG CARBON STEEL #10

## (undated) DEVICE — SYR 10CC LUER LOCK

## (undated) DEVICE — SUTURE STRATAFIX PGA 2-0 26CM

## (undated) DEVICE — FOAM POSITIONER ARM SURGICAL

## (undated) DEVICE — NDL QUINCKE SPINAL 18G 3.5IN

## (undated) DEVICE — NDL QUINCKE SPINAL 20G 3.5IN

## (undated) DEVICE — BUR BONE CUT MICRO TPS 3X3.8MM

## (undated) DEVICE — SUTURE STRATAFIX CP-2 24CM X 24CM

## (undated) DEVICE — SOL NACL IRR 1000ML BTL

## (undated) DEVICE — GLOVE 6.5 PROTEXIS PI BLUE

## (undated) DEVICE — SUT VICRYL 1 CT-1 27 UNDIE

## (undated) DEVICE — GLOVE 8.5 PROTEXIS PI BLUE

## (undated) DEVICE — SYR IRRIGATION BULB STER 60ML

## (undated) DEVICE — SYS CLSR DERMABOND PRINEO 22CM

## (undated) DEVICE — MARKER SKIN RULER AND LABEL

## (undated) DEVICE — DRAPE C-ARMOR EQUIPMENT COVER

## (undated) DEVICE — APPLICATOR CHLORAPREP ORN 26ML